# Patient Record
Sex: MALE | Race: BLACK OR AFRICAN AMERICAN | NOT HISPANIC OR LATINO | Employment: FULL TIME | URBAN - METROPOLITAN AREA
[De-identification: names, ages, dates, MRNs, and addresses within clinical notes are randomized per-mention and may not be internally consistent; named-entity substitution may affect disease eponyms.]

---

## 2021-06-19 ENCOUNTER — HOSPITAL ENCOUNTER (INPATIENT)
Facility: HOSPITAL | Age: 41
LOS: 4 days | Discharge: HOME | End: 2021-06-23
Attending: EMERGENCY MEDICINE | Admitting: INTERNAL MEDICINE

## 2021-06-19 ENCOUNTER — APPOINTMENT (OUTPATIENT)
Dept: GENERAL RADIOLOGY | Facility: HOSPITAL | Age: 41
End: 2021-06-19

## 2021-06-19 ENCOUNTER — APPOINTMENT (OUTPATIENT)
Dept: CARDIOLOGY | Facility: HOSPITAL | Age: 41
End: 2021-06-19

## 2021-06-19 DIAGNOSIS — I49.01 CARDIAC ARREST WITH VENTRICULAR FIBRILLATION (HCC): Primary | ICD-10-CM

## 2021-06-19 DIAGNOSIS — I46.9 CARDIAC ARREST WITH VENTRICULAR FIBRILLATION (HCC): Primary | ICD-10-CM

## 2021-06-19 LAB
ALBUMIN SERPL-MCNC: 4.5 G/DL (ref 3.5–5.2)
ALBUMIN/GLOB SERPL: 1.7 G/DL
ALP SERPL-CCNC: 83 U/L (ref 39–117)
ALT SERPL W P-5'-P-CCNC: 82 U/L (ref 1–41)
ANION GAP SERPL CALCULATED.3IONS-SCNC: 15.7 MMOL/L (ref 5–15)
APTT PPP: 23.3 SECONDS (ref 22.7–35.4)
ASCENDING AORTA: 2.9 CM
AST SERPL-CCNC: 66 U/L (ref 1–40)
BASOPHILS # BLD MANUAL: 0.12 10*3/MM3 (ref 0–0.2)
BASOPHILS NFR BLD AUTO: 1 % (ref 0–1.5)
BH CV ECHO MEAS - ACS: 2.2 CM
BH CV ECHO MEAS - AO MAX PG (FULL): 4.1 MMHG
BH CV ECHO MEAS - AO MAX PG: 11.6 MMHG
BH CV ECHO MEAS - AO MEAN PG (FULL): 2 MMHG
BH CV ECHO MEAS - AO MEAN PG: 6 MMHG
BH CV ECHO MEAS - AO ROOT AREA (BSA CORRECTED): 1.3
BH CV ECHO MEAS - AO ROOT AREA: 7.5 CM^2
BH CV ECHO MEAS - AO ROOT DIAM: 3.1 CM
BH CV ECHO MEAS - AO V2 MAX: 170 CM/SEC
BH CV ECHO MEAS - AO V2 MEAN: 115 CM/SEC
BH CV ECHO MEAS - AO V2 VTI: 34.6 CM
BH CV ECHO MEAS - ASC AORTA: 2.9 CM
BH CV ECHO MEAS - AVA(I,A): 2.7 CM^2
BH CV ECHO MEAS - AVA(I,D): 2.7 CM^2
BH CV ECHO MEAS - AVA(V,A): 2.5 CM^2
BH CV ECHO MEAS - AVA(V,D): 2.5 CM^2
BH CV ECHO MEAS - BSA(HAYCOCK): 2.5 M^2
BH CV ECHO MEAS - BSA: 2.4 M^2
BH CV ECHO MEAS - BZI_BMI: 38.4 KILOGRAMS/M^2
BH CV ECHO MEAS - BZI_METRIC_HEIGHT: 180.3 CM
BH CV ECHO MEAS - BZI_METRIC_WEIGHT: 124.7 KG
BH CV ECHO MEAS - CONTRAST EF (2CH): 75 CM2
BH CV ECHO MEAS - CONTRAST EF 4CH: 68 CM2
BH CV ECHO MEAS - EDV(MOD-SP2): 96 ML
BH CV ECHO MEAS - EDV(MOD-SP4): 117 ML
BH CV ECHO MEAS - EDV(TEICH): 92.4 ML
BH CV ECHO MEAS - EF(CUBED): 67.3 %
BH CV ECHO MEAS - EF(MOD-BP): 71 %
BH CV ECHO MEAS - EF(MOD-SP2): 75 %
BH CV ECHO MEAS - EF(MOD-SP4): 68.4 %
BH CV ECHO MEAS - EF(TEICH): 59 %
BH CV ECHO MEAS - ESV(MOD-SP2): 24 ML
BH CV ECHO MEAS - ESV(MOD-SP4): 37 ML
BH CV ECHO MEAS - ESV(TEICH): 37.9 ML
BH CV ECHO MEAS - FS: 31.1 %
BH CV ECHO MEAS - IVS/LVPW: 1
BH CV ECHO MEAS - IVSD: 0.8 CM
BH CV ECHO MEAS - LAT PEAK E' VEL: 12.3 CM/SEC
BH CV ECHO MEAS - LV DIASTOLIC VOL/BSA (35-75): 48.5 ML/M^2
BH CV ECHO MEAS - LV MASS(C)D: 113.6 GRAMS
BH CV ECHO MEAS - LV MASS(C)DI: 47.1 GRAMS/M^2
BH CV ECHO MEAS - LV MAX PG: 7.5 MMHG
BH CV ECHO MEAS - LV MEAN PG: 4 MMHG
BH CV ECHO MEAS - LV SYSTOLIC VOL/BSA (12-30): 15.3 ML/M^2
BH CV ECHO MEAS - LV V1 MAX: 137 CM/SEC
BH CV ECHO MEAS - LV V1 MEAN: 89.4 CM/SEC
BH CV ECHO MEAS - LV V1 VTI: 29.2 CM
BH CV ECHO MEAS - LVIDD: 4.5 CM
BH CV ECHO MEAS - LVIDS: 3.1 CM
BH CV ECHO MEAS - LVLD AP2: 8 CM
BH CV ECHO MEAS - LVLD AP4: 8.3 CM
BH CV ECHO MEAS - LVLS AP2: 6.2 CM
BH CV ECHO MEAS - LVLS AP4: 6.9 CM
BH CV ECHO MEAS - LVOT AREA (M): 3.1 CM^2
BH CV ECHO MEAS - LVOT AREA: 3.1 CM^2
BH CV ECHO MEAS - LVOT DIAM: 2 CM
BH CV ECHO MEAS - LVPWD: 0.8 CM
BH CV ECHO MEAS - MED PEAK E' VEL: 10.1 CM/SEC
BH CV ECHO MEAS - MR MAX PG: 13.2 MMHG
BH CV ECHO MEAS - MR MAX VEL: 182 CM/SEC
BH CV ECHO MEAS - MV A DUR: 0.16 SEC
BH CV ECHO MEAS - MV A MAX VEL: 47.7 CM/SEC
BH CV ECHO MEAS - MV DEC SLOPE: 625 CM/SEC^2
BH CV ECHO MEAS - MV DEC TIME: 0.18 SEC
BH CV ECHO MEAS - MV E MAX VEL: 75.3 CM/SEC
BH CV ECHO MEAS - MV E/A: 1.6
BH CV ECHO MEAS - MV MAX PG: 2.5 MMHG
BH CV ECHO MEAS - MV MEAN PG: 1 MMHG
BH CV ECHO MEAS - MV P1/2T MAX VEL: 84.9 CM/SEC
BH CV ECHO MEAS - MV P1/2T: 39.8 MSEC
BH CV ECHO MEAS - MV V2 MAX: 78.6 CM/SEC
BH CV ECHO MEAS - MV V2 MEAN: 46 CM/SEC
BH CV ECHO MEAS - MV V2 VTI: 19.7 CM
BH CV ECHO MEAS - MVA P1/2T LCG: 2.6 CM^2
BH CV ECHO MEAS - MVA(P1/2T): 5.5 CM^2
BH CV ECHO MEAS - MVA(VTI): 4.7 CM^2
BH CV ECHO MEAS - PA ACC TIME: 0.13 SEC
BH CV ECHO MEAS - PA MAX PG (FULL): 2.8 MMHG
BH CV ECHO MEAS - PA MAX PG: 5 MMHG
BH CV ECHO MEAS - PA PR(ACCEL): 20.5 MMHG
BH CV ECHO MEAS - PA V2 MAX: 112 CM/SEC
BH CV ECHO MEAS - PULM A REVS DUR: 0.13 SEC
BH CV ECHO MEAS - PULM A REVS VEL: 28 CM/SEC
BH CV ECHO MEAS - PULM DIAS VEL: 49.1 CM/SEC
BH CV ECHO MEAS - PULM S/D: 0.73
BH CV ECHO MEAS - PULM SYS VEL: 36 CM/SEC
BH CV ECHO MEAS - PVA(V,A): 2.1 CM^2
BH CV ECHO MEAS - PVA(V,D): 2.1 CM^2
BH CV ECHO MEAS - QP/QS: 0.64
BH CV ECHO MEAS - RAP SYSTOLE: 3 MMHG
BH CV ECHO MEAS - RV MAX PG: 2.2 MMHG
BH CV ECHO MEAS - RV MEAN PG: 1 MMHG
BH CV ECHO MEAS - RV V1 MAX: 74.4 CM/SEC
BH CV ECHO MEAS - RV V1 MEAN: 57.2 CM/SEC
BH CV ECHO MEAS - RV V1 VTI: 18.7 CM
BH CV ECHO MEAS - RVOT AREA: 3.1 CM^2
BH CV ECHO MEAS - RVOT DIAM: 2 CM
BH CV ECHO MEAS - RVSP: 18.7 MMHG
BH CV ECHO MEAS - SI(AO): 108.2 ML/M^2
BH CV ECHO MEAS - SI(CUBED): 25.4 ML/M^2
BH CV ECHO MEAS - SI(LVOT): 38 ML/M^2
BH CV ECHO MEAS - SI(MOD-SP2): 29.8 ML/M^2
BH CV ECHO MEAS - SI(MOD-SP4): 33.1 ML/M^2
BH CV ECHO MEAS - SI(TEICH): 22.6 ML/M^2
BH CV ECHO MEAS - SV(AO): 261.2 ML
BH CV ECHO MEAS - SV(CUBED): 61.3 ML
BH CV ECHO MEAS - SV(LVOT): 91.7 ML
BH CV ECHO MEAS - SV(MOD-SP2): 72 ML
BH CV ECHO MEAS - SV(MOD-SP4): 80 ML
BH CV ECHO MEAS - SV(RVOT): 58.7 ML
BH CV ECHO MEAS - SV(TEICH): 54.5 ML
BH CV ECHO MEAS - TAPSE (>1.6): 2.8 CM
BH CV ECHO MEAS - TR MAX VEL: 198 CM/SEC
BH CV ECHO MEASUREMENTS AVERAGE E/E' RATIO: 6.72
BH CV XLRA - RV BASE: 3.4 CM
BH CV XLRA - RV LENGTH: 5.9 CM
BH CV XLRA - RV MID: 2.8 CM
BH CV XLRA - TDI S': 15.9 CM/SEC
BILIRUB SERPL-MCNC: 0.6 MG/DL (ref 0–1.2)
BUN SERPL-MCNC: 21 MG/DL (ref 6–20)
BUN/CREAT SERPL: 13.3 (ref 7–25)
CALCIUM SPEC-SCNC: 8.8 MG/DL (ref 8.6–10.5)
CHLORIDE SERPL-SCNC: 104 MMOL/L (ref 98–107)
CO2 SERPL-SCNC: 22.3 MMOL/L (ref 22–29)
CREAT SERPL-MCNC: 1.58 MG/DL (ref 0.76–1.27)
DEPRECATED RDW RBC AUTO: 43.8 FL (ref 37–54)
ERYTHROCYTE [DISTWIDTH] IN BLOOD BY AUTOMATED COUNT: 14.5 % (ref 12.3–15.4)
GFR SERPL CREATININE-BSD FRML MDRD: 59 ML/MIN/1.73
GLOBULIN UR ELPH-MCNC: 2.6 GM/DL
GLUCOSE SERPL-MCNC: 178 MG/DL (ref 65–99)
HCT VFR BLD AUTO: 50.2 % (ref 37.5–51)
HGB BLD-MCNC: 16.4 G/DL (ref 13–17.7)
INR PPP: 1.11 (ref 0.9–1.1)
LEFT ATRIUM VOLUME INDEX: 20 ML/M2
LV EF 2D ECHO EST: 71 %
LYMPHOCYTES # BLD MANUAL: 2.11 10*3/MM3 (ref 0.7–3.1)
LYMPHOCYTES NFR BLD MANUAL: 17.3 % (ref 19.6–45.3)
LYMPHOCYTES NFR BLD MANUAL: 8.2 % (ref 5–12)
MAGNESIUM SERPL-MCNC: 1.8 MG/DL (ref 1.6–2.6)
MCH RBC QN AUTO: 27.2 PG (ref 26.6–33)
MCHC RBC AUTO-ENTMCNC: 32.7 G/DL (ref 31.5–35.7)
MCV RBC AUTO: 83.4 FL (ref 79–97)
MONOCYTES # BLD AUTO: 1 10*3/MM3 (ref 0.1–0.9)
NEUTROPHILS # BLD AUTO: 8.94 10*3/MM3 (ref 1.7–7)
NEUTROPHILS NFR BLD MANUAL: 73.5 % (ref 42.7–76)
PLAT MORPH BLD: NORMAL
PLATELET # BLD AUTO: 225 10*3/MM3 (ref 140–450)
PMV BLD AUTO: 10.5 FL (ref 6–12)
POTASSIUM SERPL-SCNC: 3.6 MMOL/L (ref 3.5–5.2)
PROT SERPL-MCNC: 7.1 G/DL (ref 6–8.5)
PROTHROMBIN TIME: 14.1 SECONDS (ref 11.7–14.2)
RBC # BLD AUTO: 6.02 10*6/MM3 (ref 4.14–5.8)
RBC MORPH BLD: NORMAL
SARS-COV-2 RNA RESP QL NAA+PROBE: NOT DETECTED
SINUS: 3 CM
SMUDGE CELLS BLD QL SMEAR: ABNORMAL
SODIUM SERPL-SCNC: 142 MMOL/L (ref 136–145)
STJ: 2.6 CM
TROPONIN T SERPL-MCNC: <0.01 NG/ML (ref 0–0.03)
WBC # BLD AUTO: 12.17 10*3/MM3 (ref 3.4–10.8)

## 2021-06-19 PROCEDURE — 96365 THER/PROPH/DIAG IV INF INIT: CPT

## 2021-06-19 PROCEDURE — C1769 GUIDE WIRE: HCPCS | Performed by: INTERNAL MEDICINE

## 2021-06-19 PROCEDURE — 99152 MOD SED SAME PHYS/QHP 5/>YRS: CPT | Performed by: INTERNAL MEDICINE

## 2021-06-19 PROCEDURE — 93005 ELECTROCARDIOGRAM TRACING: CPT | Performed by: PHYSICIAN ASSISTANT

## 2021-06-19 PROCEDURE — 83735 ASSAY OF MAGNESIUM: CPT | Performed by: PHYSICIAN ASSISTANT

## 2021-06-19 PROCEDURE — 93306 TTE W/DOPPLER COMPLETE: CPT | Performed by: INTERNAL MEDICINE

## 2021-06-19 PROCEDURE — 99291 CRITICAL CARE FIRST HOUR: CPT | Performed by: INTERNAL MEDICINE

## 2021-06-19 PROCEDURE — 85025 COMPLETE CBC W/AUTO DIFF WBC: CPT | Performed by: PHYSICIAN ASSISTANT

## 2021-06-19 PROCEDURE — 99285 EMERGENCY DEPT VISIT HI MDM: CPT

## 2021-06-19 PROCEDURE — 25010000002 AMIODARONE IN DEXTROSE 5% 360-4.14 MG/200ML-% SOLUTION: Performed by: PHYSICIAN ASSISTANT

## 2021-06-19 PROCEDURE — 93010 ELECTROCARDIOGRAM REPORT: CPT | Performed by: INTERNAL MEDICINE

## 2021-06-19 PROCEDURE — 25010000002 FENTANYL CITRATE (PF) 50 MCG/ML SOLUTION: Performed by: INTERNAL MEDICINE

## 2021-06-19 PROCEDURE — C1894 INTRO/SHEATH, NON-LASER: HCPCS | Performed by: INTERNAL MEDICINE

## 2021-06-19 PROCEDURE — 25010000002 HEPARIN (PORCINE) PER 1000 UNITS: Performed by: INTERNAL MEDICINE

## 2021-06-19 PROCEDURE — 96366 THER/PROPH/DIAG IV INF ADDON: CPT

## 2021-06-19 PROCEDURE — 85007 BL SMEAR W/DIFF WBC COUNT: CPT | Performed by: PHYSICIAN ASSISTANT

## 2021-06-19 PROCEDURE — 85610 PROTHROMBIN TIME: CPT | Performed by: PHYSICIAN ASSISTANT

## 2021-06-19 PROCEDURE — 84484 ASSAY OF TROPONIN QUANT: CPT | Performed by: PHYSICIAN ASSISTANT

## 2021-06-19 PROCEDURE — 4A023N7 MEASUREMENT OF CARDIAC SAMPLING AND PRESSURE, LEFT HEART, PERCUTANEOUS APPROACH: ICD-10-PCS | Performed by: INTERNAL MEDICINE

## 2021-06-19 PROCEDURE — 25010000002 MIDAZOLAM PER 1 MG: Performed by: INTERNAL MEDICINE

## 2021-06-19 PROCEDURE — 85730 THROMBOPLASTIN TIME PARTIAL: CPT | Performed by: PHYSICIAN ASSISTANT

## 2021-06-19 PROCEDURE — 0 IOPAMIDOL PER 1 ML: Performed by: INTERNAL MEDICINE

## 2021-06-19 PROCEDURE — B2111ZZ FLUOROSCOPY OF MULTIPLE CORONARY ARTERIES USING LOW OSMOLAR CONTRAST: ICD-10-PCS | Performed by: INTERNAL MEDICINE

## 2021-06-19 PROCEDURE — 25010000002 AMIODARONE IN DEXTROSE 5% 360-4.14 MG/200ML-% SOLUTION: Performed by: INTERNAL MEDICINE

## 2021-06-19 PROCEDURE — U0003 INFECTIOUS AGENT DETECTION BY NUCLEIC ACID (DNA OR RNA); SEVERE ACUTE RESPIRATORY SYNDROME CORONAVIRUS 2 (SARS-COV-2) (CORONAVIRUS DISEASE [COVID-19]), AMPLIFIED PROBE TECHNIQUE, MAKING USE OF HIGH THROUGHPUT TECHNOLOGIES AS DESCRIBED BY CMS-2020-01-R: HCPCS | Performed by: PHYSICIAN ASSISTANT

## 2021-06-19 PROCEDURE — B2151ZZ FLUOROSCOPY OF LEFT HEART USING LOW OSMOLAR CONTRAST: ICD-10-PCS | Performed by: INTERNAL MEDICINE

## 2021-06-19 PROCEDURE — 80053 COMPREHEN METABOLIC PANEL: CPT | Performed by: PHYSICIAN ASSISTANT

## 2021-06-19 PROCEDURE — 25010000002 PERFLUTREN (DEFINITY) 8.476 MG IN SODIUM CHLORIDE (PF) 0.9 % 10 ML INJECTION: Performed by: PHYSICIAN ASSISTANT

## 2021-06-19 PROCEDURE — 93458 L HRT ARTERY/VENTRICLE ANGIO: CPT | Performed by: INTERNAL MEDICINE

## 2021-06-19 PROCEDURE — 93306 TTE W/DOPPLER COMPLETE: CPT

## 2021-06-19 PROCEDURE — 71045 X-RAY EXAM CHEST 1 VIEW: CPT

## 2021-06-19 RX ORDER — ACETAMINOPHEN 325 MG/1
650 TABLET ORAL EVERY 4 HOURS PRN
Status: DISCONTINUED | OUTPATIENT
Start: 2021-06-19 | End: 2021-06-23 | Stop reason: HOSPADM

## 2021-06-19 RX ORDER — SODIUM CHLORIDE 9 MG/ML
INJECTION, SOLUTION INTRAVENOUS CONTINUOUS PRN
Status: COMPLETED | OUTPATIENT
Start: 2021-06-19 | End: 2021-06-19

## 2021-06-19 RX ORDER — LIDOCAINE HYDROCHLORIDE 20 MG/ML
INJECTION, SOLUTION INFILTRATION; PERINEURAL AS NEEDED
Status: DISCONTINUED | OUTPATIENT
Start: 2021-06-19 | End: 2021-06-19 | Stop reason: HOSPADM

## 2021-06-19 RX ORDER — FENTANYL CITRATE 50 UG/ML
INJECTION, SOLUTION INTRAMUSCULAR; INTRAVENOUS AS NEEDED
Status: DISCONTINUED | OUTPATIENT
Start: 2021-06-19 | End: 2021-06-19 | Stop reason: HOSPADM

## 2021-06-19 RX ORDER — MIDAZOLAM HYDROCHLORIDE 1 MG/ML
INJECTION INTRAMUSCULAR; INTRAVENOUS AS NEEDED
Status: DISCONTINUED | OUTPATIENT
Start: 2021-06-19 | End: 2021-06-19 | Stop reason: HOSPADM

## 2021-06-19 RX ADMIN — PERFLUTREN 1 ML: 6.52 INJECTION, SUSPENSION INTRAVENOUS at 14:43

## 2021-06-19 RX ADMIN — AMIODARONE HYDROCHLORIDE 0.5 MG/MIN: 1.8 INJECTION, SOLUTION INTRAVENOUS at 20:01

## 2021-06-19 RX ADMIN — AMIODARONE HYDROCHLORIDE 1 MG/MIN: 1.8 INJECTION, SOLUTION INTRAVENOUS at 13:48

## 2021-06-20 LAB
ANION GAP SERPL CALCULATED.3IONS-SCNC: 10.6 MMOL/L (ref 5–15)
BUN SERPL-MCNC: 19 MG/DL (ref 6–20)
BUN/CREAT SERPL: 16.1 (ref 7–25)
CALCIUM SPEC-SCNC: 8.8 MG/DL (ref 8.6–10.5)
CHLORIDE SERPL-SCNC: 105 MMOL/L (ref 98–107)
CHOLEST SERPL-MCNC: 154 MG/DL (ref 0–200)
CO2 SERPL-SCNC: 23.4 MMOL/L (ref 22–29)
CREAT SERPL-MCNC: 1.18 MG/DL (ref 0.76–1.27)
DEPRECATED RDW RBC AUTO: 42 FL (ref 37–54)
ERYTHROCYTE [DISTWIDTH] IN BLOOD BY AUTOMATED COUNT: 14.4 % (ref 12.3–15.4)
GFR SERPL CREATININE-BSD FRML MDRD: 83 ML/MIN/1.73
GLUCOSE SERPL-MCNC: 83 MG/DL (ref 65–99)
HBA1C MFR BLD: 5.4 % (ref 4.8–5.6)
HCT VFR BLD AUTO: 45 % (ref 37.5–51)
HDLC SERPL-MCNC: 43 MG/DL (ref 40–60)
HGB BLD-MCNC: 14.9 G/DL (ref 13–17.7)
LDLC SERPL CALC-MCNC: 100 MG/DL (ref 0–100)
LDLC/HDLC SERPL: 2.33 {RATIO}
MCH RBC QN AUTO: 26.7 PG (ref 26.6–33)
MCHC RBC AUTO-ENTMCNC: 33.1 G/DL (ref 31.5–35.7)
MCV RBC AUTO: 80.6 FL (ref 79–97)
PLATELET # BLD AUTO: 222 10*3/MM3 (ref 140–450)
PMV BLD AUTO: 9.9 FL (ref 6–12)
POTASSIUM SERPL-SCNC: 4 MMOL/L (ref 3.5–5.2)
QT INTERVAL: 380 MS
RBC # BLD AUTO: 5.58 10*6/MM3 (ref 4.14–5.8)
SODIUM SERPL-SCNC: 139 MMOL/L (ref 136–145)
TRIGL SERPL-MCNC: 53 MG/DL (ref 0–150)
VLDLC SERPL-MCNC: 11 MG/DL (ref 5–40)
WBC # BLD AUTO: 6.76 10*3/MM3 (ref 3.4–10.8)

## 2021-06-20 PROCEDURE — 85027 COMPLETE CBC AUTOMATED: CPT | Performed by: INTERNAL MEDICINE

## 2021-06-20 PROCEDURE — 80061 LIPID PANEL: CPT | Performed by: INTERNAL MEDICINE

## 2021-06-20 PROCEDURE — 83036 HEMOGLOBIN GLYCOSYLATED A1C: CPT | Performed by: INTERNAL MEDICINE

## 2021-06-20 PROCEDURE — 80048 BASIC METABOLIC PNL TOTAL CA: CPT | Performed by: INTERNAL MEDICINE

## 2021-06-20 PROCEDURE — 25010000002 AMIODARONE IN DEXTROSE 5% 360-4.14 MG/200ML-% SOLUTION: Performed by: INTERNAL MEDICINE

## 2021-06-20 PROCEDURE — 99231 SBSQ HOSP IP/OBS SF/LOW 25: CPT | Performed by: INTERNAL MEDICINE

## 2021-06-20 RX ORDER — METOPROLOL SUCCINATE 25 MG/1
25 TABLET, EXTENDED RELEASE ORAL
Status: DISCONTINUED | OUTPATIENT
Start: 2021-06-20 | End: 2021-06-22

## 2021-06-20 RX ADMIN — AMIODARONE HYDROCHLORIDE 0.5 MG/MIN: 1.8 INJECTION, SOLUTION INTRAVENOUS at 08:25

## 2021-06-20 RX ADMIN — ACETAMINOPHEN 650 MG: 325 TABLET, FILM COATED ORAL at 23:53

## 2021-06-20 RX ADMIN — METOPROLOL SUCCINATE 25 MG: 25 TABLET, EXTENDED RELEASE ORAL at 10:09

## 2021-06-21 PROCEDURE — 99231 SBSQ HOSP IP/OBS SF/LOW 25: CPT | Performed by: INTERNAL MEDICINE

## 2021-06-21 PROCEDURE — 99254 IP/OBS CNSLTJ NEW/EST MOD 60: CPT | Performed by: INTERNAL MEDICINE

## 2021-06-21 RX ADMIN — METOPROLOL SUCCINATE 25 MG: 25 TABLET, EXTENDED RELEASE ORAL at 09:21

## 2021-06-22 ENCOUNTER — APPOINTMENT (OUTPATIENT)
Dept: MRI IMAGING | Facility: HOSPITAL | Age: 41
End: 2021-06-22

## 2021-06-22 ENCOUNTER — APPOINTMENT (OUTPATIENT)
Dept: GENERAL RADIOLOGY | Facility: HOSPITAL | Age: 41
End: 2021-06-22

## 2021-06-22 PROCEDURE — 33249 INSJ/RPLCMT DEFIB W/LEAD(S): CPT | Performed by: INTERNAL MEDICINE

## 2021-06-22 PROCEDURE — C1730 CATH, EP, 19 OR FEW ELECT: HCPCS | Performed by: INTERNAL MEDICINE

## 2021-06-22 PROCEDURE — 93620 COMP EP EVL R AT VEN PAC&REC: CPT | Performed by: INTERNAL MEDICINE

## 2021-06-22 PROCEDURE — 25010000002 MIDAZOLAM PER 1 MG: Performed by: INTERNAL MEDICINE

## 2021-06-22 PROCEDURE — A9577 INJ MULTIHANCE: HCPCS | Performed by: INTERNAL MEDICINE

## 2021-06-22 PROCEDURE — 4A0234Z MEASUREMENT OF CARDIAC ELECTRICAL ACTIVITY, PERCUTANEOUS APPROACH: ICD-10-PCS | Performed by: INTERNAL MEDICINE

## 2021-06-22 PROCEDURE — 0 GADOBENATE DIMEGLUMINE 529 MG/ML SOLUTION: Performed by: INTERNAL MEDICINE

## 2021-06-22 PROCEDURE — 71045 X-RAY EXAM CHEST 1 VIEW: CPT

## 2021-06-22 PROCEDURE — C1777 LEAD, AICD, ENDO SINGLE COIL: HCPCS | Performed by: INTERNAL MEDICINE

## 2021-06-22 PROCEDURE — 93005 ELECTROCARDIOGRAM TRACING: CPT | Performed by: INTERNAL MEDICINE

## 2021-06-22 PROCEDURE — 02HK3KZ INSERTION OF DEFIBRILLATOR LEAD INTO RIGHT VENTRICLE, PERCUTANEOUS APPROACH: ICD-10-PCS | Performed by: INTERNAL MEDICINE

## 2021-06-22 PROCEDURE — 0JH608Z INSERTION OF DEFIBRILLATOR GENERATOR INTO CHEST SUBCUTANEOUS TISSUE AND FASCIA, OPEN APPROACH: ICD-10-PCS | Performed by: INTERNAL MEDICINE

## 2021-06-22 PROCEDURE — 25010000002 VANCOMYCIN PER 500 MG: Performed by: INTERNAL MEDICINE

## 2021-06-22 PROCEDURE — 25010000002 FENTANYL CITRATE (PF) 50 MCG/ML SOLUTION: Performed by: INTERNAL MEDICINE

## 2021-06-22 PROCEDURE — 99152 MOD SED SAME PHYS/QHP 5/>YRS: CPT | Performed by: INTERNAL MEDICINE

## 2021-06-22 PROCEDURE — 99153 MOD SED SAME PHYS/QHP EA: CPT | Performed by: INTERNAL MEDICINE

## 2021-06-22 PROCEDURE — 99231 SBSQ HOSP IP/OBS SF/LOW 25: CPT | Performed by: INTERNAL MEDICINE

## 2021-06-22 PROCEDURE — C1894 INTRO/SHEATH, NON-LASER: HCPCS | Performed by: INTERNAL MEDICINE

## 2021-06-22 PROCEDURE — 75561 CARDIAC MRI FOR MORPH W/DYE: CPT

## 2021-06-22 PROCEDURE — C1722 AICD, SINGLE CHAMBER: HCPCS | Performed by: INTERNAL MEDICINE

## 2021-06-22 PROCEDURE — 75561 CARDIAC MRI FOR MORPH W/DYE: CPT | Performed by: INTERNAL MEDICINE

## 2021-06-22 DEVICE — IMPLANTABLE CARDIOVERTER DEFIBRILLATOR VR
Type: IMPLANTABLE DEVICE | Status: FUNCTIONAL
Brand: VIGILANT™ EL ICD VR

## 2021-06-22 DEVICE — INTEGRATED BIPOLAR PACE/SENSE AND DEFIBRILLATION LEAD
Type: IMPLANTABLE DEVICE | Status: FUNCTIONAL
Brand: RELIANCE 4-FRONT™

## 2021-06-22 RX ORDER — SODIUM CHLORIDE 9 MG/ML
INJECTION, SOLUTION INTRAVENOUS CONTINUOUS PRN
Status: COMPLETED | OUTPATIENT
Start: 2021-06-22 | End: 2021-06-22

## 2021-06-22 RX ORDER — ACETAMINOPHEN 650 MG/1
650 SUPPOSITORY RECTAL EVERY 4 HOURS PRN
Status: DISCONTINUED | OUTPATIENT
Start: 2021-06-22 | End: 2021-06-23 | Stop reason: HOSPADM

## 2021-06-22 RX ORDER — LIDOCAINE HYDROCHLORIDE AND EPINEPHRINE 10; 10 MG/ML; UG/ML
INJECTION, SOLUTION INFILTRATION; PERINEURAL AS NEEDED
Status: DISCONTINUED | OUTPATIENT
Start: 2021-06-22 | End: 2021-06-22 | Stop reason: HOSPADM

## 2021-06-22 RX ORDER — VANCOMYCIN HYDROCHLORIDE 1 G/200ML
INJECTION, SOLUTION INTRAVENOUS CONTINUOUS PRN
Status: COMPLETED | OUTPATIENT
Start: 2021-06-22 | End: 2021-06-22

## 2021-06-22 RX ORDER — MIDAZOLAM HYDROCHLORIDE 1 MG/ML
INJECTION INTRAMUSCULAR; INTRAVENOUS AS NEEDED
Status: DISCONTINUED | OUTPATIENT
Start: 2021-06-22 | End: 2021-06-22 | Stop reason: HOSPADM

## 2021-06-22 RX ORDER — SODIUM CHLORIDE 0.9 % (FLUSH) 0.9 %
3 SYRINGE (ML) INJECTION EVERY 12 HOURS SCHEDULED
Status: DISCONTINUED | OUTPATIENT
Start: 2021-06-22 | End: 2021-06-23 | Stop reason: HOSPADM

## 2021-06-22 RX ORDER — METOPROLOL SUCCINATE 50 MG/1
50 TABLET, EXTENDED RELEASE ORAL
Status: DISCONTINUED | OUTPATIENT
Start: 2021-06-22 | End: 2021-06-23 | Stop reason: HOSPADM

## 2021-06-22 RX ORDER — ACETAMINOPHEN 325 MG/1
650 TABLET ORAL EVERY 4 HOURS PRN
Status: DISCONTINUED | OUTPATIENT
Start: 2021-06-22 | End: 2021-06-23 | Stop reason: HOSPADM

## 2021-06-22 RX ORDER — SODIUM CHLORIDE 0.9 % (FLUSH) 0.9 %
10 SYRINGE (ML) INJECTION AS NEEDED
Status: DISCONTINUED | OUTPATIENT
Start: 2021-06-22 | End: 2021-06-23 | Stop reason: HOSPADM

## 2021-06-22 RX ORDER — HYDROCODONE BITARTRATE AND ACETAMINOPHEN 5; 325 MG/1; MG/1
1 TABLET ORAL EVERY 4 HOURS PRN
Status: DISCONTINUED | OUTPATIENT
Start: 2021-06-22 | End: 2021-06-23 | Stop reason: HOSPADM

## 2021-06-22 RX ORDER — NALOXONE HCL 0.4 MG/ML
0.4 VIAL (ML) INJECTION
Status: DISCONTINUED | OUTPATIENT
Start: 2021-06-22 | End: 2021-06-23 | Stop reason: HOSPADM

## 2021-06-22 RX ORDER — FENTANYL CITRATE 50 UG/ML
INJECTION, SOLUTION INTRAMUSCULAR; INTRAVENOUS AS NEEDED
Status: DISCONTINUED | OUTPATIENT
Start: 2021-06-22 | End: 2021-06-22 | Stop reason: HOSPADM

## 2021-06-22 RX ORDER — HYDROMORPHONE HYDROCHLORIDE 1 MG/ML
0.5 INJECTION, SOLUTION INTRAMUSCULAR; INTRAVENOUS; SUBCUTANEOUS
Status: DISCONTINUED | OUTPATIENT
Start: 2021-06-22 | End: 2021-06-23 | Stop reason: HOSPADM

## 2021-06-22 RX ADMIN — METOPROLOL SUCCINATE 25 MG: 25 TABLET, EXTENDED RELEASE ORAL at 11:22

## 2021-06-22 RX ADMIN — GADOBENATE DIMEGLUMINE 20 ML: 529 INJECTION, SOLUTION INTRAVENOUS at 10:24

## 2021-06-22 RX ADMIN — SODIUM CHLORIDE, PRESERVATIVE FREE 3 ML: 5 INJECTION INTRAVENOUS at 21:26

## 2021-06-22 RX ADMIN — METOPROLOL SUCCINATE 50 MG: 50 TABLET, EXTENDED RELEASE ORAL at 21:26

## 2021-06-23 VITALS
TEMPERATURE: 97.8 F | WEIGHT: 275 LBS | SYSTOLIC BLOOD PRESSURE: 122 MMHG | DIASTOLIC BLOOD PRESSURE: 88 MMHG | HEART RATE: 55 BPM | OXYGEN SATURATION: 98 % | BODY MASS INDEX: 38.5 KG/M2 | HEIGHT: 71 IN | RESPIRATION RATE: 16 BRPM

## 2021-06-23 LAB
QT INTERVAL: 374 MS
QT INTERVAL: 388 MS

## 2021-06-23 PROCEDURE — 99024 POSTOP FOLLOW-UP VISIT: CPT | Performed by: INTERNAL MEDICINE

## 2021-06-23 PROCEDURE — 99238 HOSP IP/OBS DSCHRG MGMT 30/<: CPT | Performed by: INTERNAL MEDICINE

## 2021-06-23 PROCEDURE — 93005 ELECTROCARDIOGRAM TRACING: CPT | Performed by: INTERNAL MEDICINE

## 2021-06-23 RX ORDER — METOPROLOL SUCCINATE 50 MG/1
50 TABLET, EXTENDED RELEASE ORAL
Qty: 30 TABLET | Refills: 11 | Status: SHIPPED | OUTPATIENT
Start: 2021-06-24 | End: 2022-11-02

## 2021-06-23 RX ADMIN — ACETAMINOPHEN 650 MG: 325 TABLET, FILM COATED ORAL at 04:41

## 2021-06-23 RX ADMIN — SODIUM CHLORIDE, PRESERVATIVE FREE 3 ML: 5 INJECTION INTRAVENOUS at 08:15

## 2021-06-23 RX ADMIN — ACETAMINOPHEN 650 MG: 325 TABLET, FILM COATED ORAL at 09:04

## 2021-06-30 ENCOUNTER — CLINICAL SUPPORT NO REQUIREMENTS (OUTPATIENT)
Dept: CARDIOLOGY | Facility: CLINIC | Age: 41
End: 2021-06-30

## 2021-06-30 DIAGNOSIS — I49.01 CARDIAC ARREST WITH VENTRICULAR FIBRILLATION (HCC): Primary | ICD-10-CM

## 2021-06-30 DIAGNOSIS — I46.9 CARDIAC ARREST WITH VENTRICULAR FIBRILLATION (HCC): Primary | ICD-10-CM

## 2021-06-30 PROCEDURE — 93282 PRGRMG EVAL IMPLANTABLE DFB: CPT | Performed by: INTERNAL MEDICINE

## 2021-07-01 ENCOUNTER — TELEPHONE (OUTPATIENT)
Dept: CARDIOLOGY | Facility: CLINIC | Age: 41
End: 2021-07-01

## 2021-07-01 NOTE — TELEPHONE ENCOUNTER
I think it would be okay for him to drive.  That is if he has had no further episodes on his interrogation of his device

## 2021-07-01 NOTE — TELEPHONE ENCOUNTER
Patient would like to know when he can start driving again and return to work.    He is s/p ICD placement on 6/22/21 for SVT.  He is scheduled for f/u with Sonia on 7/26/21.    Does he need to wait until f/u appt to be evaluated?

## 2021-07-01 NOTE — TELEPHONE ENCOUNTER
We should be seeing him for an incision check.    I think he should hold off until driving until we do his incision check and then we can decide.  They can come and talk to me at that time.

## 2021-07-12 ENCOUNTER — OFFICE VISIT (OUTPATIENT)
Dept: CARDIOLOGY | Facility: CLINIC | Age: 41
End: 2021-07-12

## 2021-07-12 VITALS
BODY MASS INDEX: 39.2 KG/M2 | HEART RATE: 57 BPM | DIASTOLIC BLOOD PRESSURE: 92 MMHG | HEIGHT: 71 IN | SYSTOLIC BLOOD PRESSURE: 147 MMHG | WEIGHT: 280 LBS

## 2021-07-12 DIAGNOSIS — Z95.810 AICD (AUTOMATIC CARDIOVERTER/DEFIBRILLATOR) PRESENT: ICD-10-CM

## 2021-07-12 DIAGNOSIS — I46.9 CARDIAC ARREST WITH VENTRICULAR FIBRILLATION (HCC): Primary | ICD-10-CM

## 2021-07-12 DIAGNOSIS — I49.01 CARDIAC ARREST WITH VENTRICULAR FIBRILLATION (HCC): Primary | ICD-10-CM

## 2021-07-12 PROCEDURE — 99213 OFFICE O/P EST LOW 20 MIN: CPT | Performed by: INTERNAL MEDICINE

## 2021-07-12 NOTE — PROGRESS NOTES
"2 week follow up    Subjective:        Raymond Pascual is a 41 y.o. male who here for follow up    CC  SUDDEN DEATH  AICD  HPI  41 years old male with known history of sudden cardiac death aborted with a defibrillator here for the follow-up underwent AICD implantation has been doing well with no complaints of chest pains tightness heaviness or the pressure sensation     Problems Addressed this Visit        Cardiac and Vasculature    Cardiac arrest with ventricular fibrillation (CMS/HCC) - Primary    Relevant Orders    Adult Transthoracic Echo Complete W/ Cont if Necessary Per Protocol    AICD (automatic cardioverter/defibrillator) present      Diagnoses       Codes Comments    Cardiac arrest with ventricular fibrillation (CMS/HCC)    -  Primary ICD-10-CM: I46.9, I49.01  ICD-9-CM: 427.5, 427.41     AICD (automatic cardioverter/defibrillator) present     ICD-10-CM: Z95.810  ICD-9-CM: V45.02         .    The following portions of the patient's history were reviewed and updated as appropriate: allergies, current medications, past family history, past medical history, past social history, past surgical history and problem list.    History reviewed. No pertinent past medical history.  reports that he has never smoked. He has never used smokeless tobacco. He reports previous alcohol use. He reports previous drug use. Drug: Marijuana. History reviewed. No pertinent family history.    Review of Systems  Constitutional: No wt loss, fever, fatigue  Gastrointestinal: No nausea, abdominal pain  Behavioral/Psych: No insomnia or anxiety   Cardiovascular no chest pains or tightness in the chest  Objective:       Physical Exam  /92   Pulse 57   Ht 180.3 cm (70.98\")   Wt 127 kg (280 lb)   BMI 39.07 kg/m²   General appearance: No acute changes   Neck: Trachea midline; NECK, supple, no thyromegaly or lymphadenopathy   Lungs: Normal size and shape, normal breath sounds, equal distribution of air, no rales and rhonchi   CV: S1-S2 " regular, no murmurs, no rub, no gallop   Abdomen: Soft, non-tender; no masses , no abnormal abdominal sounds   Extremities: No deformity , normal color , no peripheral edema   Skin: Normal temperature, turgor and texture; no rash, ulcers          Procedures      Echocardiogram:        Current Outpatient Medications:   •  metoprolol succinate XL (TOPROL-XL) 50 MG 24 hr tablet, Take 1 tablet by mouth Daily., Disp: 30 tablet, Rfl: 11   Assessment:        Patient Active Problem List   Diagnosis   • Cardiac arrest with ventricular fibrillation (CMS/HCC)               Plan:            ICD-10-CM ICD-9-CM   1. Cardiac arrest with ventricular fibrillation (CMS/HCC)  I46.9 427.5    I49.01 427.41   2. AICD (automatic cardioverter/defibrillator) present  Z95.810 V45.02     1. Cardiac arrest with ventricular fibrillation (CMS/HCC)  Considering patient's medical condition as well as the risk factors, patient will require echocardiogram for further evaluation for the LV function, four-chamber evaluation, including the pressures, valvular function and  pericardial disease and pericardial effusion    - Adult Transthoracic Echo Complete W/ Cont if Necessary Per Protocol; Future    2. AICD (automatic cardioverter/defibrillator) present  AICD healing well     REDUCE TOPROL 25 MG due to feeling weak and tired    SEE IN 1 YR WITH ECHO  COUNSELING:    Raymond Talley was given to patient for the following topics: diagnostic results, risk factor reductions, impressions, risks and benefits of treatment options and importance of treatment compliance .       SMOKING COUNSELING:    [unfilled]    Dictated using Dragon dictation

## 2021-07-13 ENCOUNTER — TELEPHONE (OUTPATIENT)
Dept: CARDIOLOGY | Facility: CLINIC | Age: 41
End: 2021-07-13

## 2021-07-13 NOTE — TELEPHONE ENCOUNTER
----- Message from Adele Ayala MA sent at 7/12/2021  2:37 PM EDT -----  PT WOULD LIKE TO SWITCH TO OUR OFFICE FOR PM CHECKS.   THANKS   ADELE

## 2021-07-26 ENCOUNTER — OFFICE VISIT (OUTPATIENT)
Dept: CARDIOLOGY | Facility: CLINIC | Age: 41
End: 2021-07-26

## 2021-07-26 ENCOUNTER — CLINICAL SUPPORT NO REQUIREMENTS (OUTPATIENT)
Dept: CARDIOLOGY | Facility: CLINIC | Age: 41
End: 2021-07-26

## 2021-07-26 VITALS
WEIGHT: 281 LBS | SYSTOLIC BLOOD PRESSURE: 138 MMHG | BODY MASS INDEX: 39.34 KG/M2 | HEIGHT: 71 IN | HEART RATE: 59 BPM | DIASTOLIC BLOOD PRESSURE: 98 MMHG

## 2021-07-26 DIAGNOSIS — Z95.810 AICD (AUTOMATIC CARDIOVERTER/DEFIBRILLATOR) PRESENT: Primary | ICD-10-CM

## 2021-07-26 DIAGNOSIS — E66.09 CLASS 2 OBESITY DUE TO EXCESS CALORIES WITH BODY MASS INDEX (BMI) OF 39.0 TO 39.9 IN ADULT, UNSPECIFIED WHETHER SERIOUS COMORBIDITY PRESENT: ICD-10-CM

## 2021-07-26 DIAGNOSIS — I47.29 IDIOPATHIC VENTRICULAR TACHYCARDIA (HCC): Primary | ICD-10-CM

## 2021-07-26 DIAGNOSIS — Z95.810 ICD (IMPLANTABLE CARDIOVERTER-DEFIBRILLATOR) IN PLACE: ICD-10-CM

## 2021-07-26 DIAGNOSIS — R03.0 ELEVATED BLOOD PRESSURE READING: ICD-10-CM

## 2021-07-26 PROBLEM — E66.812 CLASS 2 OBESITY DUE TO EXCESS CALORIES WITH BODY MASS INDEX (BMI) OF 39.0 TO 39.9 IN ADULT: Status: ACTIVE | Noted: 2021-07-26

## 2021-07-26 PROCEDURE — 93000 ELECTROCARDIOGRAM COMPLETE: CPT | Performed by: NURSE PRACTITIONER

## 2021-07-26 PROCEDURE — 99024 POSTOP FOLLOW-UP VISIT: CPT | Performed by: NURSE PRACTITIONER

## 2021-07-26 NOTE — PROGRESS NOTES
Date of Office Visit: 2021  Encounter Provider: CRUZ Cortez  Place of Service: [facility] CARDIOLOGY  Patient Name: Raymond Pascual  :1980    Chief Complaint   Patient presents with   • Cardiac Arrest w/vfib     1 month hosp f/u   • s/p ICD placement   :     HPI: Raymond Pascual is a 41 y.o. male who presented to the hospital in  after having a near syncopal episode while playing basketball at [facility], when EMS arrived he was in wide-complex tachycardia with hypotension, he was defibrillated and treated with amiodarone---he then passed out and had brief CPR.    He had a cardiac cath which showed mild early atherosclerotic plaque but otherwise normal with normal LV systolic function and his echo was also normal.    He underwent an EP study on  and was found to have sustained monomorphic VT from the RV near the region of the his.  He underwent implantation of a single-chamber ICD.    He was started on metoprolol and presents today for routine 1 month follow-up and device check.    He is doing well, denies chest pain, dyspnea, PND, orthopnea, dizziness or edema.     He has started resuming normal activities, without difficulty.     Device interrogation shows normal testing and function with no arrhythmia episodes.  He has RV paced less than 1%.     He saw Dr. Miguel a couple weeks ago and was doing well, no changes made and plans to see him again in 1 year with a follow-up echo per the notes.         Past Medical History:   Diagnosis Date   • Cardiac arrest with ventricular fibrillation (CMS/[facility]) 2021   • S/P ICD (internal cardiac defibrillator) procedure     ICD placement       Past Surgical History:   Procedure Laterality Date   • APPENDECTOMY     • CARDIAC CATHETERIZATION N/A 2021    Procedure: Left Heart Cath;  Surgeon: Demond Miguel MD;  Location: [facility] CATH INVASIVE LOCATION;  Service: Cardiovascular;  Laterality: N/A;   • CARDIAC  CATHETERIZATION N/A 6/19/2021    Procedure: Coronary angiography;  Surgeon: Demond Miguel MD;  Location:  [facility] CATH INVASIVE LOCATION;  Service: Cardiovascular;  Laterality: N/A;   • CARDIAC CATHETERIZATION N/A 6/19/2021    Procedure: Left ventriculography;  Surgeon: Demond Miguel MD;  Location:  [facility] CATH INVASIVE LOCATION;  Service: Cardiovascular;  Laterality: N/A;   • CARDIAC ELECTROPHYSIOLOGY PROCEDURE N/A 6/22/2021    Procedure: EP Study, possible ablation of SVT  possible ICD;  Surgeon: Boy Dumont MD;  Location:  [facility] CATH INVASIVE LOCATION;  Service: Cardiovascular;  Laterality: N/A;   • CARDIAC ELECTROPHYSIOLOGY PROCEDURE N/A 6/22/2021    Procedure: Device Implant: single chamber ICD;  Surgeon: Boy Dumont MD;  [facility];  Service: Cardiovascular;  Laterality: N/A;   • TARSAL TUNNEL RELEASE         Social History     Socioeconomic History   • Marital status:      Spouse name: Not on file   • Number of children: Not on file   • Years of education: Not on file   • Highest education level: Not on file   Tobacco Use   • Smoking status: Never Smoker   • Smokeless tobacco: Never Used   Vaping Use   • Vaping Use: Never used   Substance and Sexual Activity   • Alcohol use: Not Currently   • Drug use: Not Currently     Types: Marijuana   • Sexual activity: Defer       History reviewed. No pertinent family history.    Review of Systems   Constitutional: Negative for chills, fever and malaise/fatigue.   Cardiovascular: Negative for chest pain, dyspnea on exertion, leg swelling, near-syncope, orthopnea, palpitations, paroxysmal nocturnal dyspnea and syncope.   Respiratory: Negative for cough and shortness of breath.    Musculoskeletal: Negative for joint pain, joint swelling and myalgias.   Gastrointestinal: Negative for abdominal pain, diarrhea, melena, nausea and vomiting.   Genitourinary: Negative for frequency and hematuria.   Neurological: Negative  "for light-headedness, numbness, paresthesias and seizures.   Allergic/Immunologic: Negative.    All other systems reviewed and are negative.      No Known Allergies      Current Outpatient Medications:   •  metoprolol succinate XL (TOPROL-XL) 50 MG 24 hr tablet, Take 1 tablet by mouth Daily. (Patient taking differently: Take 25 mg by mouth Daily.), Disp: 30 tablet, Rfl: 11      Objective:     Vitals:    07/26/21 0855   BP: 138/98   Pulse: 59   Weight: 127 kg (281 lb)   Height: 180.3 cm (71\")     Body mass index is 39.19 kg/m².    PHYSICAL EXAM:    Vitals Reviewed.   General Appearance: No acute distress, well developed and well nourished.   Eyes: Conjunctiva and lids: No erythema, swelling, or discharge. Sclera non-icteric.   HENT: Atraumatic, normocephalic. External eyes, ears, and nose normal.   Respiratory: No signs of respiratory distress. Respiration rhythm and depth normal.   Clear to auscultation. No rales, crackles, rhonchi, or wheezing auscultated.   Cardiovascular:  Jugular Venous Pressure: Normal  Heart Rate and Rhythm: Normal, Heart Sounds: Normal S1 and S2. No S3 or S4 noted.  Murmurs: No murmurs noted. No rubs, thrills, or gallops.   Arterial Pulses:  Posterior tibialis and dorsalis pedis pulses normal.   Lower Extremities: No edema noted.  Gastrointestinal:  Abdomen soft, non-distended, non-tender.   Musculoskeletal: Normal movement of extremities  Skin and Nails: General appearance normal. No pallor, cyanosis, diaphoresis. Skin temperature normal. No clubbing of fingernails.   Psychiatric: Patient alert and oriented to person, place, and time. Speech and behavior appropriate. Normal mood and affect.       ECG 12 Lead    Date/Time: 7/26/2021 9:00 AM  Performed by: Sonia Berry APRN  Authorized by: Sonia Berry APRN   Comparison: compared with previous ECG   Similar to previous ECG  Rhythm: sinus rhythm  BPM: 59                Assessment:       Diagnosis Plan   1. Idiopathic ventricular " tachycardia (CMS/HCC)  ECG 12 Lead   2. ICD (implantable cardioverter-defibrillator) in place  ECG 12 Lead   3. Class 2 obesity due to excess calories with body mass index (BMI) of 39.0 to 39.9 in adult, unspecified whether serious comorbidity present     4. Elevated blood pressure reading            Plan:       1.-2. Idiopathic VT, s/p secondary preventions ICD placed 6/22/2021    3. For the problem of overweight/obesity, we discussed the importance of lifestyle measures and strategies for weight loss, such as improved nutrition, regular exercise and sleep hygiene.     4.  Blood pressure mildly elevated today, he is going to monitor this at home and I have instructed him to follow-up with PCP or cardiology if it is persistently elevated.    Follow-up with Dr. Dumont in 3 months with an in clinic device check and then will likely see us yearly.         As always, it has been a pleasure to participate in your patient's care.      Sincerely,         CRUZ Crystal

## 2021-11-01 ENCOUNTER — OFFICE VISIT (OUTPATIENT)
Dept: CARDIOLOGY | Facility: CLINIC | Age: 41
End: 2021-11-01

## 2021-11-01 ENCOUNTER — CLINICAL SUPPORT NO REQUIREMENTS (OUTPATIENT)
Dept: CARDIOLOGY | Facility: CLINIC | Age: 41
End: 2021-11-01

## 2021-11-01 VITALS
DIASTOLIC BLOOD PRESSURE: 86 MMHG | BODY MASS INDEX: 40.32 KG/M2 | SYSTOLIC BLOOD PRESSURE: 132 MMHG | HEART RATE: 53 BPM | HEIGHT: 71 IN | WEIGHT: 288 LBS

## 2021-11-01 DIAGNOSIS — I46.9 CARDIAC ARREST WITH VENTRICULAR FIBRILLATION (HCC): Primary | ICD-10-CM

## 2021-11-01 DIAGNOSIS — I49.01 CARDIAC ARREST WITH VENTRICULAR FIBRILLATION (HCC): Primary | ICD-10-CM

## 2021-11-01 DIAGNOSIS — Z95.810 ICD (IMPLANTABLE CARDIOVERTER-DEFIBRILLATOR) IN PLACE: ICD-10-CM

## 2021-11-01 DIAGNOSIS — I47.29 IDIOPATHIC VENTRICULAR TACHYCARDIA (HCC): ICD-10-CM

## 2021-11-01 DIAGNOSIS — R03.0 ELEVATED BLOOD PRESSURE READING: ICD-10-CM

## 2021-11-01 PROCEDURE — 93290 INTERROG DEV EVAL ICPMS IP: CPT | Performed by: INTERNAL MEDICINE

## 2021-11-01 PROCEDURE — 99213 OFFICE O/P EST LOW 20 MIN: CPT | Performed by: INTERNAL MEDICINE

## 2021-11-01 PROCEDURE — 93000 ELECTROCARDIOGRAM COMPLETE: CPT | Performed by: INTERNAL MEDICINE

## 2021-11-01 PROCEDURE — 93282 PRGRMG EVAL IMPLANTABLE DFB: CPT | Performed by: INTERNAL MEDICINE

## 2021-11-01 NOTE — PROGRESS NOTES
Date of Office Visit: 2021  Encounter Provider: Boy Dumont MD  Place of Service: [facility] CARDIOLOGY  Patient Name: Raymond Pascual  : 1980    Subjective:     Encounter Date:2021      Patient ID: Raymond Pascual is a 41 y.o. male who has a cc of  Idiopathic VT and cardiac arrest due to rapid VT.     I put in an ICD in  here for normal.       The patient had a good year.   No anginal chest pain,   No sig torres,   No soa,   No fainting,  No orthostasis.   No edema.   Exercise tolerance: good.     There have been no hospital admission since the last visit.     There have been no bleeding events.       Past Medical History:   Diagnosis Date   • Cardiac arrest with ventricular fibrillation (HCC) 2021   • S/P ICD (internal cardiac defibrillator) procedure     ICD placement       Social History     Socioeconomic History   • Marital status:    Tobacco Use   • Smoking status: Never Smoker   • Smokeless tobacco: Never Used   Vaping Use   • Vaping Use: Never used   Substance and Sexual Activity   • Alcohol use: Not Currently   • Drug use: Not Currently     Types: Marijuana   • Sexual activity: Defer       History reviewed. No pertinent family history.    Review of Systems   Constitutional: Negative for fever and night sweats.   HENT: Negative for ear pain and stridor.    Eyes: Negative for discharge and visual halos.   Cardiovascular: Negative for cyanosis.   Respiratory: Negative for hemoptysis and sputum production.    Hematologic/Lymphatic: Negative for adenopathy.   Skin: Negative for nail changes and unusual hair distribution.   Musculoskeletal: Negative for gout and joint swelling.   Gastrointestinal: Negative for bowel incontinence and flatus.   Genitourinary: Negative for dysuria and flank pain.   Neurological: Negative for seizures and tremors.   Psychiatric/Behavioral: Negative for altered mental status. The patient is not nervous/anxious.             Objective:  "    Vitals:    11/01/21 0849   BP: 132/86   Pulse: 53   Weight: 131 kg (288 lb)   Height: 180.3 cm (71\")         Eyes:      General:         Right eye: No discharge.         Left eye: No discharge.   HENT:      Head: Normocephalic and atraumatic.   Neck:      Thyroid: No thyromegaly.      Vascular: No JVD.   Pulmonary:      Effort: Pulmonary effort is normal.      Breath sounds: Normal breath sounds. No rales.   Cardiovascular:      Normal rate. Regular rhythm.      No gallop.   Edema:     Peripheral edema absent.   Abdominal:      General: Bowel sounds are normal.      Palpations: Abdomen is soft.      Tenderness: There is no abdominal tenderness.   Musculoskeletal: Normal range of motion.         General: No deformity. Skin:     General: Skin is warm and dry.      Findings: No erythema.   Neurological:      Mental Status: Alert and oriented to person, place, and time.      Motor: Normal muscle tone.   Psychiatric:         Behavior: Behavior normal.         Thought Content: Thought content normal.           ECG 12 Lead    Date/Time: 11/1/2021 9:41 AM  Performed by: Boy Dumont MD  Authorized by: Boy Dumont MD   Comparison: compared with previous ECG   Similar to previous ECG  Rhythm: sinus rhythm  Rate: normal  Conduction: conduction normal  ST Segments: ST segments normal  T Waves: T waves normal  QRS axis: normal    Clinical impression: normal ECG            Lab Review:       Assessment:          Diagnosis Plan   1. Cardiac arrest with ventricular fibrillation (HCC)     2. ICD (implantable cardioverter-defibrillator) in place     3. Idiopathic ventricular tachycardia (HCC)     4. Elevated blood pressure reading            Plan:     VT -- controlled on metoprolol.     ICD-- I reviewed the pacemaker/ICD tracings and the pacing and sensing parameters are normal.  No episodes.     Body mass index is 40.17 kg/m². -- we disc weight loss. And regular exercise         "

## 2022-11-02 ENCOUNTER — CLINICAL SUPPORT NO REQUIREMENTS (OUTPATIENT)
Dept: CARDIOLOGY | Facility: CLINIC | Age: 42
End: 2022-11-02

## 2022-11-02 ENCOUNTER — OFFICE VISIT (OUTPATIENT)
Dept: CARDIOLOGY | Facility: CLINIC | Age: 42
End: 2022-11-02

## 2022-11-02 VITALS
HEART RATE: 66 BPM | HEIGHT: 71 IN | SYSTOLIC BLOOD PRESSURE: 136 MMHG | WEIGHT: 288 LBS | DIASTOLIC BLOOD PRESSURE: 88 MMHG | BODY MASS INDEX: 40.32 KG/M2

## 2022-11-02 DIAGNOSIS — I46.9 CARDIAC ARREST WITH VENTRICULAR FIBRILLATION: Primary | ICD-10-CM

## 2022-11-02 DIAGNOSIS — I49.01 CARDIAC ARREST WITH VENTRICULAR FIBRILLATION: Primary | ICD-10-CM

## 2022-11-02 DIAGNOSIS — Z95.810 ICD (IMPLANTABLE CARDIOVERTER-DEFIBRILLATOR) IN PLACE: ICD-10-CM

## 2022-11-02 DIAGNOSIS — I47.29 IDIOPATHIC VENTRICULAR TACHYCARDIA: Primary | ICD-10-CM

## 2022-11-02 PROCEDURE — 99213 OFFICE O/P EST LOW 20 MIN: CPT | Performed by: NURSE PRACTITIONER

## 2022-11-02 PROCEDURE — 93000 ELECTROCARDIOGRAM COMPLETE: CPT | Performed by: NURSE PRACTITIONER

## 2022-11-02 PROCEDURE — 93282 PRGRMG EVAL IMPLANTABLE DFB: CPT | Performed by: INTERNAL MEDICINE

## 2022-11-02 PROCEDURE — 93290 INTERROG DEV EVAL ICPMS IP: CPT | Performed by: INTERNAL MEDICINE

## 2022-11-02 RX ORDER — METOPROLOL SUCCINATE 50 MG/1
50 TABLET, EXTENDED RELEASE ORAL DAILY
Qty: 30 TABLET | Refills: 11 | Status: SHIPPED | OUTPATIENT
Start: 2022-11-02

## 2022-11-02 NOTE — PROGRESS NOTES
Date of Office Visit: 2022  Encounter Provider: CRUZ Cortez  Place of Service: [facility] CARDIOLOGY  Patient Name: Raymond Pascual  :1980    Chief Complaint   Patient presents with   • idiopathic ventricular tachycardia     1 yr f/u    • cardiac arrest w/V-fib   • Pacemaker Check   :     HPI: Raymond Pascual is a 42 y.o. male who follows with Dr. iMguel and Dr. Dumont for idiopathic VT.    Presented to the hospital in 2021 after having near syncopal episode while playing basketball [employer], when EMS arrived he was in a wide-complex tachycardia with hypotension, he was defibrillated and treated with amiodarone.    Cath showed mild early atherosclerotic plaque but otherwise normal and normal LV systolic function by echo.    EP study in 2021 showed sustained monomorphic VT from the RV near the region of that he has, underwent implantation of a single-chamber ICD.    He presents today for annual follow-up.    He is doing well.  He denies chest pain, dyspnea, PND, orthopnea, dizziness or edema.    He has not been taking his metoprolol because he ran out and he has not been back to see Dr. Miguel to get his refills so he has been off of it for a couple of months.    Device interrogation shows normal function, he is RV paced less than 1%.  He does have 7 days NSVT episodes and poorly VT episodes no therapies have been delivered and all of the available EGM's appear to be sinus tach.  They also correlate with him working out at the gym as he has been trying to get back into shape.  They are all from 10/24 and 10/21.       Past Medical History:   Diagnosis Date   • Cardiac arrest with ventricular fibrillation (HCC) 2021   • S/P ICD (internal cardiac defibrillator) procedure     ICD placement       Past Surgical History:   Procedure Laterality Date   • APPENDECTOMY     • CARDIAC CATHETERIZATION N/A 2021    Procedure: Left Heart Cath;  Surgeon:  Demond Miguel MD;  Location: [facility] CATH INVASIVE LOCATION;  Service: Cardiovascular;  Laterality: N/A;   • CARDIAC CATHETERIZATION N/A 6/19/2021    Procedure: Coronary angiography;  Surgeon: Demond Miguel MD;  [facility];  Service: Cardiovascular;  Laterality: N/A;   • CARDIAC CATHETERIZATION N/A 6/19/2021    Procedure: Left ventriculography;  Surgeon: Demond Miguel MD;  [facility];  Service: Cardiovascular;  Laterality: N/A;   • CARDIAC ELECTROPHYSIOLOGY PROCEDURE N/A 6/22/2021    Procedure: EP Study, possible ablation of SVT  possible ICD;  Surgeon: Boy Dumont MD;  Location: [facility] CATH INVASIVE LOCATION;  Service: Cardiovascular;  Laterality: N/A;   • CARDIAC ELECTROPHYSIOLOGY PROCEDURE N/A 6/22/2021    Procedure: Device Implant: single chamber ICD;  Surgeon: Boy Dumont MD;  Location: [facility] INVASIVE LOCATION;  Service: Cardiovascular;  Laterality: N/A;   • TARSAL TUNNEL RELEASE         Social History     Socioeconomic History   • Marital status:    Tobacco Use   • Smoking status: Never   • Smokeless tobacco: Never   Vaping Use   • Vaping Use: Never used   Substance and Sexual Activity   • Alcohol use: Not Currently   • Drug use: Not Currently     Types: Marijuana   • Sexual activity: Defer       History reviewed. No pertinent family history.    Review of Systems   Constitutional: Negative for chills, fever and malaise/fatigue.   Cardiovascular: Negative for chest pain, dyspnea on exertion, leg swelling, near-syncope, orthopnea, palpitations, paroxysmal nocturnal dyspnea and syncope.   Respiratory: Negative for cough and shortness of breath.    Musculoskeletal: Negative for joint pain, joint swelling and myalgias.   Gastrointestinal: Negative for abdominal pain, diarrhea, melena, nausea and vomiting.   Genitourinary: Negative for frequency and hematuria.   Neurological: Negative for light-headedness,  "numbness, paresthesias and seizures.   Allergic/Immunologic: Negative.    All other systems reviewed and are negative.      No Known Allergies      Current Outpatient Medications:   •  metoprolol succinate XL (TOPROL-XL) 50 MG 24 hr tablet, Take 1 tablet by mouth Daily. (Patient taking differently: Take 1 tablet by mouth Daily. HAS NOT BEEN TAKING), Disp: 30 tablet, Rfl: 11  •  metoprolol succinate XL (TOPROL-XL) 50 MG 24 hr tablet, Take 1 tablet by mouth Daily., Disp: 30 tablet, Rfl: 11      Objective:     Vitals:    11/02/22 1047   BP: 136/88   Pulse: 66   Weight: 131 kg (288 lb)   Height: 180.3 cm (71\")     Body mass index is 40.17 kg/m².    PHYSICAL EXAM:    Vitals Reviewed.   General Appearance: No acute distress, well developed and well nourished.   Eyes: Conjunctiva and lids: No erythema, swelling, or discharge. Sclera non-icteric.   HENT: Atraumatic, normocephalic. External eyes, ears, and nose normal.   Respiratory: No signs of respiratory distress. Respiration rhythm and depth normal.   Clear to auscultation. No rales, crackles, rhonchi, or wheezing auscultated.   Cardiovascular:  Heart Rate and Rhythm: Normal, Heart Sounds: Normal S1 and S2. No S3 or S4 noted.  Murmurs: No murmurs noted. No rubs, thrills, or gallops.   Arterial Pulses:  Posterior tibialis and dorsalis pedis pulses normal.   Lower Extremities: No edema noted.  Gastrointestinal:  Abdomen soft, non-distended, non-tender.   Musculoskeletal: Normal movement of extremities  Skin: Warm and dry.   Psychiatric: Patient alert and oriented to person, place, and time. Speech and behavior appropriate. Normal mood and affect.       ECG 12 Lead    Date/Time: 11/2/2022 11:02 AM  Performed by: Sonia Berry APRN  Authorized by: Sonia Berry APRN   Comparison: compared with previous ECG   Similar to previous ECG  Rhythm: sinus rhythm  BPM: 66                Assessment:       Diagnosis Plan   1. Idiopathic ventricular tachycardia  ECG 12 Lead      2. " ICD (implantable cardioverter-defibrillator) in place  ECG 12 Lead             Plan:       1.-3.  Idiopathic VT, status post ICD.  Normal function, already paces less than 1%.  He had ran out of his metoprolol, I sent in a prescription and he will pick that up today and get started back on that.    Follow-up with Dr. Miguel as recommended and Dr. Dumont in 1 year with device check.    As always, it has been a pleasure to participate in your patient's care.      Sincerely,         CRUZ Crystal

## 2023-05-08 ENCOUNTER — TELEPHONE (OUTPATIENT)
Dept: CARDIOLOGY | Facility: CLINIC | Age: 43
End: 2023-05-08
Payer: COMMERCIAL

## 2023-05-08 NOTE — TELEPHONE ENCOUNTER
[place] patient of Dr. Miguel and Dr. Dumont.      FYI: Patient received ICD shock for VT yesterday, 5/7/23 while playing basketball.  A message has been sent to Dr. Dumont in a separate encounter to address.    Patient was last seen by Dr. LR on 7/12/21.  He was supposed to follow up in one year with echo.

## 2023-05-08 NOTE — TELEPHONE ENCOUNTER
Patient left voicemail this morning stating that while playing basketball yesterday he received a shock.    It was just one, and he is ok now.    He just wanted us to be aware, and make sure there isn't anything else that needs to be done.

## 2023-05-11 NOTE — PROGRESS NOTES
ICD shock  Also follows with Dr Dumont   Subjective:        Raymond Pascual is a 43 y.o. male who here for follow up    CC  VT AICD SHOCK  HPI  43-year-old male with AICD, ventricular tachycardia as well as a primary hypertension here for the follow-up with no complaints of chest pains tightness heaviness or the pressure sensation    Patient had 1 episode of ventricular tachycardia while playing the basketball successfully defibrillated     Problems Addressed this Visit        Cardiac and Vasculature    ICD (implantable cardioverter-defibrillator) in place    VT (ventricular tachycardia) - Primary    Relevant Orders    Treadmill Stress Test    Adult Transthoracic Echo Complete W/ Cont if Necessary Per Protocol    Primary hypertension   Diagnoses       Codes Comments    VT (ventricular tachycardia)    -  Primary ICD-10-CM: I47.20  ICD-9-CM: 427.1     ICD (implantable cardioverter-defibrillator) in place     ICD-10-CM: Z95.810  ICD-9-CM: V45.02     Primary hypertension     ICD-10-CM: I10  ICD-9-CM: 401.9         .    The following portions of the patient's history were reviewed and updated as appropriate: allergies, current medications, past family history, past medical history, past social history, past surgical history and problem list.    Past Medical History:   Diagnosis Date   • Cardiac arrest with ventricular fibrillation 06/2021   • S/P ICD (internal cardiac defibrillator) procedure     ICD placement     reports that he has never smoked. He has never used smokeless tobacco. He reports that he does not currently use alcohol. He reports that he does not currently use drugs after having used the following drugs: Marijuana. History reviewed. No pertinent family history.    Review of Systems  Constitutional: No wt loss, fever, fatigue  Gastrointestinal: No nausea, abdominal pain  Behavioral/Psych: No insomnia or anxiety   Cardiovascular no chest pains or tightness in the chest  Objective:       Physical Exam         "   Physical Exam  /98 (BP Location: Right arm)   Pulse (!) 49   Ht 180.3 cm (71\")   Wt 128 kg (281 lb 9.6 oz)   BMI 39.28 kg/m²     General appearance: No acute changes   Eyes: Sclerae conjunctivae normal, pupils reactive   HENT: Atraumatic; oropharynx clear with moist mucous membranes and no mucosal ulcerations;  Neck: Trachea midline; NECK, supple, no thyromegaly or lymphadenopathy   Lungs: Normal size and shape, normal breath sounds, equal distribution of air, no rales and rhonchi   CV: S1-S2 regular, no murmurs, no rub, no gallop   Abdomen: Soft, nontender; no masses , no abnormal abdominal sounds   Extremities: No deformity , normal color , no peripheral edema   Skin: Normal temperature, turgor and texture; no rash, ulcers  Psych: Appropriate affect, alert and oriented to person, place and time           Procedures      Echocardiogram:        Current Outpatient Medications:   •  metoprolol succinate XL (TOPROL-XL) 50 MG 24 hr tablet, Take 1 tablet by mouth Daily., Disp: 30 tablet, Rfl: 11   Assessment:        Patient Active Problem List   Diagnosis   • Cardiac arrest with ventricular fibrillation   • ICD (implantable cardioverter-defibrillator) in place   • VT (ventricular tachycardia)   • Class 2 obesity due to excess calories with body mass index (BMI) of 39.0 to 39.9 in adult   • Elevated blood pressure reading   • Primary hypertension               Plan:            ICD-10-CM ICD-9-CM   1. VT (ventricular tachycardia)  I47.20 427.1   2. ICD (implantable cardioverter-defibrillator) in place  Z95.810 V45.02   3. Primary hypertension  I10 401.9     1. VT (ventricular tachycardia)    1 episode of ventricular tachycardia approximately couple weeks ago requiring AICD shock    Considering the patient's symptoms as well as clinical situation and  EKG findings, along with cardiac risk factors, ischemic workup is necessary to rule out ischemic cardiomyopathy, stress induced arrhythmias, and functional " capacity for diagnosis as well as prognostic consideration    Considering patient's medical condition as well as the risk factors, patient will require echocardiogram for further evaluation for the LV function, four-chamber evaluation, including the pressures, valvular function and  pericardial disease and pericardial effusion    - Treadmill Stress Test  - Adult Transthoracic Echo Complete W/ Cont if Necessary Per Protocol    2. ICD (implantable cardioverter-defibrillator) in place  AICD functioning normally    3. Primary hypertension  Blood pressure under control       ETT , ECHO    FOLLOW UP    VT    CON BETA BLOCKERS    BP AT HOME 130/80  COUNSELING:    Raymond Talley was given to patient for the following topics: diagnostic results, risk factor reductions, impressions, risks and benefits of treatment options and importance of treatment compliance .       SMOKING COUNSELING:        Dictated using Dragon dictation

## 2023-05-15 ENCOUNTER — OFFICE VISIT (OUTPATIENT)
Dept: CARDIOLOGY | Facility: CLINIC | Age: 43
End: 2023-05-15
Payer: COMMERCIAL

## 2023-05-15 VITALS
DIASTOLIC BLOOD PRESSURE: 98 MMHG | HEIGHT: 71 IN | WEIGHT: 281.6 LBS | SYSTOLIC BLOOD PRESSURE: 145 MMHG | HEART RATE: 49 BPM | BODY MASS INDEX: 39.42 KG/M2

## 2023-05-15 DIAGNOSIS — I10 PRIMARY HYPERTENSION: ICD-10-CM

## 2023-05-15 DIAGNOSIS — I47.20 VT (VENTRICULAR TACHYCARDIA): Primary | ICD-10-CM

## 2023-05-15 DIAGNOSIS — Z95.810 ICD (IMPLANTABLE CARDIOVERTER-DEFIBRILLATOR) IN PLACE: ICD-10-CM

## 2023-05-15 PROCEDURE — 99214 OFFICE O/P EST MOD 30 MIN: CPT | Performed by: INTERNAL MEDICINE

## 2023-05-16 PROBLEM — I10 PRIMARY HYPERTENSION: Status: ACTIVE | Noted: 2023-05-16

## 2023-05-16 PROBLEM — I47.20 VT (VENTRICULAR TACHYCARDIA): Status: ACTIVE | Noted: 2021-07-26

## 2023-06-26 LAB
AORTIC DIMENSIONLESS INDEX: 0.5 (DI)
BH CV ECHO MEAS - ACS: 2.3 CM
BH CV ECHO MEAS - AO MAX PG: 7.3 MMHG
BH CV ECHO MEAS - AO MEAN PG: 4.2 MMHG
BH CV ECHO MEAS - AO ROOT DIAM: 2.43 CM
BH CV ECHO MEAS - AO V2 MAX: 134.7 CM/SEC
BH CV ECHO MEAS - AO V2 VTI: 32 CM
BH CV ECHO MEAS - AVA(I,D): 1.63 CM2
BH CV ECHO MEAS - EDV(CUBED): 67.6 ML
BH CV ECHO MEAS - EDV(MOD-SP2): 110 ML
BH CV ECHO MEAS - EDV(MOD-SP4): 103 ML
BH CV ECHO MEAS - EF(MOD-BP): 61.1 %
BH CV ECHO MEAS - EF(MOD-SP2): 60.9 %
BH CV ECHO MEAS - EF(MOD-SP4): 60.2 %
BH CV ECHO MEAS - ESV(CUBED): 22.2 ML
BH CV ECHO MEAS - ESV(MOD-SP2): 43 ML
BH CV ECHO MEAS - ESV(MOD-SP4): 41 ML
BH CV ECHO MEAS - FS: 31 %
BH CV ECHO MEAS - IVS/LVPW: 1.21 CM
BH CV ECHO MEAS - IVSD: 1.3 CM
BH CV ECHO MEAS - LA DIMENSION: 2.9 CM
BH CV ECHO MEAS - LAT PEAK E' VEL: 13.5 CM/SEC
BH CV ECHO MEAS - LV DIASTOLIC VOL/BSA (35-75): 42.3 CM2
BH CV ECHO MEAS - LV MASS(C)D: 167.6 GRAMS
BH CV ECHO MEAS - LV MAX PG: 2.8 MMHG
BH CV ECHO MEAS - LV MEAN PG: 1.07 MMHG
BH CV ECHO MEAS - LV SYSTOLIC VOL/BSA (12-30): 16.8 CM2
BH CV ECHO MEAS - LV V1 MAX: 83.9 CM/SEC
BH CV ECHO MEAS - LV V1 VTI: 15 CM
BH CV ECHO MEAS - LVIDD: 4.1 CM
BH CV ECHO MEAS - LVIDS: 2.8 CM
BH CV ECHO MEAS - LVOT AREA: 3.5 CM2
BH CV ECHO MEAS - LVOT DIAM: 2.1 CM
BH CV ECHO MEAS - LVPWD: 1.08 CM
BH CV ECHO MEAS - MED PEAK E' VEL: 8.5 CM/SEC
BH CV ECHO MEAS - MV A DUR: 0.13 SEC
BH CV ECHO MEAS - MV A MAX VEL: 63.1 CM/SEC
BH CV ECHO MEAS - MV DEC SLOPE: 273.8 CM/SEC2
BH CV ECHO MEAS - MV DEC TIME: 194 MSEC
BH CV ECHO MEAS - MV E MAX VEL: 75.2 CM/SEC
BH CV ECHO MEAS - MV E/A: 1.19
BH CV ECHO MEAS - MV MAX PG: 3.1 MMHG
BH CV ECHO MEAS - MV MEAN PG: 0.98 MMHG
BH CV ECHO MEAS - MV P1/2T: 88.3 MSEC
BH CV ECHO MEAS - MV V2 VTI: 30.8 CM
BH CV ECHO MEAS - MVA(P1/2T): 2.49 CM2
BH CV ECHO MEAS - MVA(VTI): 1.69 CM2
BH CV ECHO MEAS - PA ACC TIME: 0.19 SEC
BH CV ECHO MEAS - PA V2 MAX: 77.4 CM/SEC
BH CV ECHO MEAS - PULM A REVS DUR: 0.15 SEC
BH CV ECHO MEAS - PULM A REVS VEL: 23 CM/SEC
BH CV ECHO MEAS - PULM DIAS VEL: 35.5 CM/SEC
BH CV ECHO MEAS - PULM S/D: 0.98
BH CV ECHO MEAS - PULM SYS VEL: 34.9 CM/SEC
BH CV ECHO MEAS - QP/QS: 2.39
BH CV ECHO MEAS - RAP SYSTOLE: 3 MMHG
BH CV ECHO MEAS - RV MAX PG: 1.47 MMHG
BH CV ECHO MEAS - RV V1 MAX: 60.6 CM/SEC
BH CV ECHO MEAS - RV V1 VTI: 15.8 CM
BH CV ECHO MEAS - RVDD: 3.1 CM
BH CV ECHO MEAS - RVOT DIAM: 3.2 CM
BH CV ECHO MEAS - RVSP: 29.1 MMHG
BH CV ECHO MEAS - SI(MOD-SP2): 27.5 ML/M2
BH CV ECHO MEAS - SI(MOD-SP4): 25.4 ML/M2
BH CV ECHO MEAS - SV(LVOT): 52 ML
BH CV ECHO MEAS - SV(MOD-SP2): 67 ML
BH CV ECHO MEAS - SV(MOD-SP4): 62 ML
BH CV ECHO MEAS - SV(RVOT): 124.2 ML
BH CV ECHO MEAS - TAPSE (>1.6): 3 CM
BH CV ECHO MEAS - TR MAX PG: 26.1 MMHG
BH CV ECHO MEAS - TR MAX VEL: 255.4 CM/SEC
BH CV ECHO MEASUREMENTS AVERAGE E/E' RATIO: 6.84
BH CV XLRA - RV BASE: 4.4 CM
BH CV XLRA - RV LENGTH: 6.5 CM
BH CV XLRA - RV MID: 3.1 CM
BH CV XLRA - TDI S': 15.4 CM/SEC
LEFT ATRIUM VOLUME INDEX: 15 ML/M2
MAXIMAL PREDICTED HEART RATE: 178 BPM
SINUS: 2.6 CM
STJ: 3 CM
STRESS TARGET HR: 151 BPM

## 2023-06-29 LAB
BH CV STRESS BP STAGE 1: NORMAL
BH CV STRESS BP STAGE 2: NORMAL
BH CV STRESS BP STAGE 3: NORMAL
BH CV STRESS BP STAGE 4: NORMAL
BH CV STRESS DURATION MIN STAGE 1: 3
BH CV STRESS DURATION MIN STAGE 2: 3
BH CV STRESS DURATION MIN STAGE 3: 3
BH CV STRESS DURATION SEC STAGE 1: 30
BH CV STRESS DURATION SEC STAGE 2: 0
BH CV STRESS DURATION SEC STAGE 3: 0
BH CV STRESS DURATION SEC STAGE 4: 51
BH CV STRESS GRADE STAGE 1: 10
BH CV STRESS GRADE STAGE 2: 12
BH CV STRESS GRADE STAGE 3: 14
BH CV STRESS GRADE STAGE 4: 16
BH CV STRESS HR STAGE 1: 114
BH CV STRESS HR STAGE 2: 140
BH CV STRESS HR STAGE 3: 150
BH CV STRESS HR STAGE 4: 171
BH CV STRESS METS STAGE 1: 4.6
BH CV STRESS METS STAGE 2: 7.1
BH CV STRESS METS STAGE 3: 10.2
BH CV STRESS METS STAGE 4: 11.5
BH CV STRESS PROTOCOL 1: NORMAL
BH CV STRESS RECOVERY BP: NORMAL MMHG
BH CV STRESS RECOVERY HR: 92 BPM
BH CV STRESS SPEED STAGE 1: 1.7
BH CV STRESS SPEED STAGE 2: 2.5
BH CV STRESS SPEED STAGE 3: 3.4
BH CV STRESS SPEED STAGE 4: 4.2
BH CV STRESS STAGE 1: 1
BH CV STRESS STAGE 2: 2
BH CV STRESS STAGE 3: 3
BH CV STRESS STAGE 4: 4
MAXIMAL PREDICTED HEART RATE: 178 BPM
PERCENT MAX PREDICTED HR: 96.07 %
STRESS BASELINE BP: NORMAL MMHG
STRESS BASELINE HR: 60 BPM
STRESS O2 SAT REST: 99 %
STRESS PERCENT HR: 113 %
STRESS POST ESTIMATED WORKLOAD: 11.5 METS
STRESS POST EXERCISE DUR MIN: 10 MIN
STRESS POST EXERCISE DUR SEC: 21 SEC
STRESS POST PEAK BP: NORMAL MMHG
STRESS POST PEAK HR: 171 BPM
STRESS TARGET HR: 151 BPM

## 2023-09-11 ENCOUNTER — OFFICE VISIT (OUTPATIENT)
Dept: CARDIOLOGY | Facility: CLINIC | Age: 43
End: 2023-09-11
Payer: COMMERCIAL

## 2023-09-11 VITALS
SYSTOLIC BLOOD PRESSURE: 126 MMHG | HEIGHT: 71 IN | WEIGHT: 288 LBS | BODY MASS INDEX: 40.32 KG/M2 | DIASTOLIC BLOOD PRESSURE: 87 MMHG | HEART RATE: 55 BPM

## 2023-09-11 DIAGNOSIS — I47.20 VT (VENTRICULAR TACHYCARDIA): Primary | ICD-10-CM

## 2023-09-11 DIAGNOSIS — Z95.810 ICD (IMPLANTABLE CARDIOVERTER-DEFIBRILLATOR) IN PLACE: ICD-10-CM

## 2023-09-11 DIAGNOSIS — R42 DIZZINESS: ICD-10-CM

## 2023-09-11 PROCEDURE — 99213 OFFICE O/P EST LOW 20 MIN: CPT | Performed by: INTERNAL MEDICINE

## 2023-09-11 NOTE — PROGRESS NOTES
STRESS TEST RESULTS   Subjective:        Raymond Pascual is a 43 y.o. male who here for follow up    CC  RANDOM TINGLING HAND  DIZZINESS  HPI  43-year-old male with ICD ventricular tachycardia has been having random tingling in hands and dizziness     Problems Addressed this Visit          Cardiac and Vasculature    ICD (implantable cardioverter-defibrillator) in place    VT (ventricular tachycardia) - Primary    Relevant Orders    Adult Transthoracic Echo Complete W/ Cont if Necessary Per Protocol       Symptoms and Signs    Dizziness     Diagnoses         Codes Comments    VT (ventricular tachycardia)    -  Primary ICD-10-CM: I47.20  ICD-9-CM: 427.1     Dizziness     ICD-10-CM: R42  ICD-9-CM: 780.4     ICD (implantable cardioverter-defibrillator) in place     ICD-10-CM: Z95.810  ICD-9-CM: V45.02           .    The following portions of the patient's history were reviewed and updated as appropriate: allergies, current medications, past family history, past medical history, past social history, past surgical history and problem list.    Past Medical History:   Diagnosis Date    Cardiac arrest with ventricular fibrillation 06/2021    Hypertension     at age 17 on meds for 1 -2 yr. lost 30-40 LBS and no longer need meds    S/P ICD (internal cardiac defibrillator) procedure     ICD placement     reports that he has never smoked. He has never used smokeless tobacco. He reports that he does not currently use alcohol. He reports that he does not currently use drugs after having used the following drugs: Marijuana.   Family History   Problem Relation Age of Onset    Hypertension Mother     Heart disease Father         passed at age 38    Hypertension Father     Diabetes Father        Review of Systems  Constitutional: No wt loss, fever, fatigue  Gastrointestinal: No nausea, abdominal pain  Behavioral/Psych: No insomnia or anxiety   Cardiovascular tingling in hands  Objective:       Physical Exam           Physical Exam  BP  "126/87 (BP Location: Left arm, Patient Position: Sitting, Cuff Size: Adult)   Pulse 55   Ht 180.3 cm (71\")   Wt 131 kg (288 lb)   BMI 40.17 kg/m²     General appearance: No acute changes   Eyes: Sclerae conjunctivae normal, pupils reactive   HENT: Atraumatic; oropharynx clear with moist mucous membranes and no mucosal ulcerations;  Neck: Trachea midline; NECK, supple, no thyromegaly or lymphadenopathy   Lungs: Normal size and shape, normal breath sounds, equal distribution of air, no rales and rhonchi   CV: S1-S2 regular, no murmurs, no rub, no gallop   Abdomen: Soft, nontender; no masses , no abnormal abdominal sounds   Extremities: No deformity , normal color , no peripheral edema   Skin: Normal temperature, turgor and texture; no rash, ulcers  Psych: Appropriate affect, alert and oriented to person, place and time           Procedures      Echocardiogram:        Current Outpatient Medications:     metoprolol succinate XL (TOPROL-XL) 50 MG 24 hr tablet, Take 1 tablet by mouth Daily., Disp: 30 tablet, Rfl: 11   Assessment:        Patient Active Problem List   Diagnosis    Cardiac arrest with ventricular fibrillation    ICD (implantable cardioverter-defibrillator) in place    VT (ventricular tachycardia)    Class 2 obesity due to excess calories with body mass index (BMI) of 39.0 to 39.9 in adult    Elevated blood pressure reading    Primary hypertension    Dizziness               Plan:            ICD-10-CM ICD-9-CM   1. VT (ventricular tachycardia)  I47.20 427.1   2. Dizziness  R42 780.4   3. ICD (implantable cardioverter-defibrillator) in place  Z95.810 V45.02     1. VT (ventricular tachycardia)  Considering patient's medical condition as well as the risk factors, patient will require echocardiogram for further evaluation for the LV function, four-chamber evaluation, including the pressures, valvular function and  pericardial disease and pericardial effusion    - Adult Transthoracic Echo Complete W/ Cont if " Necessary Per Protocol; Future    2. Dizziness  Considering patient's medical condition as well as the risk factors, patient will require echocardiogram for further evaluation for the LV function, four-chamber evaluation, including the pressures, valvular function and  pericardial disease and pericardial effusion      3. ICD (implantable cardioverter-defibrillator) in place  Stable        1 YR  WITH ECHO  COUNSELING:    Raymond Talley was given to patient for the following topics: diagnostic results, risk factor reductions, impressions, risks and benefits of treatment options and importance of treatment compliance .       SMOKING COUNSELING:        Dictated using Dragon dictation

## 2023-09-26 PROBLEM — R42 DIZZINESS: Status: ACTIVE | Noted: 2023-09-26

## 2023-10-30 ENCOUNTER — TELEPHONE (OUTPATIENT)
Dept: CARDIOLOGY | Facility: CLINIC | Age: 43
End: 2023-10-30
Payer: COMMERCIAL

## 2023-10-30 NOTE — TELEPHONE ENCOUNTER
Remote transmission reviewed.  Patient received one shock on 10/27/23 at 1943 for VT.    Attempted to call patient to assess symptoms.  LVM.

## 2023-11-07 ENCOUNTER — APPOINTMENT (OUTPATIENT)
Dept: GENERAL RADIOLOGY | Facility: HOSPITAL | Age: 43
End: 2023-11-07
Payer: COMMERCIAL

## 2023-11-07 VITALS
WEIGHT: 290 LBS | BODY MASS INDEX: 40.6 KG/M2 | TEMPERATURE: 98.4 F | HEART RATE: 63 BPM | HEIGHT: 71 IN | SYSTOLIC BLOOD PRESSURE: 150 MMHG | DIASTOLIC BLOOD PRESSURE: 100 MMHG | OXYGEN SATURATION: 98 % | RESPIRATION RATE: 16 BRPM

## 2023-11-07 LAB
ALBUMIN SERPL-MCNC: 4.4 G/DL (ref 3.5–5.2)
ALBUMIN/GLOB SERPL: 1.7 G/DL
ALP SERPL-CCNC: 76 U/L (ref 39–117)
ALT SERPL W P-5'-P-CCNC: 14 U/L (ref 1–41)
ANION GAP SERPL CALCULATED.3IONS-SCNC: 7 MMOL/L (ref 5–15)
AST SERPL-CCNC: 25 U/L (ref 1–40)
BASOPHILS # BLD AUTO: 0.07 10*3/MM3 (ref 0–0.2)
BASOPHILS NFR BLD AUTO: 1 % (ref 0–1.5)
BILIRUB SERPL-MCNC: 0.4 MG/DL (ref 0–1.2)
BUN SERPL-MCNC: 17 MG/DL (ref 6–20)
BUN/CREAT SERPL: 13 (ref 7–25)
CALCIUM SPEC-SCNC: 8.9 MG/DL (ref 8.6–10.5)
CHLORIDE SERPL-SCNC: 101 MMOL/L (ref 98–107)
CO2 SERPL-SCNC: 30 MMOL/L (ref 22–29)
CREAT SERPL-MCNC: 1.31 MG/DL (ref 0.76–1.27)
DEPRECATED RDW RBC AUTO: 41.5 FL (ref 37–54)
EGFRCR SERPLBLD CKD-EPI 2021: 69.7 ML/MIN/1.73
EOSINOPHIL # BLD AUTO: 0.16 10*3/MM3 (ref 0–0.4)
EOSINOPHIL NFR BLD AUTO: 2.3 % (ref 0.3–6.2)
ERYTHROCYTE [DISTWIDTH] IN BLOOD BY AUTOMATED COUNT: 14 % (ref 12.3–15.4)
GLOBULIN UR ELPH-MCNC: 2.6 GM/DL
GLUCOSE SERPL-MCNC: 102 MG/DL (ref 65–99)
HCT VFR BLD AUTO: 47.9 % (ref 37.5–51)
HGB BLD-MCNC: 15.5 G/DL (ref 13–17.7)
HOLD SPECIMEN: NORMAL
HOLD SPECIMEN: NORMAL
IMM GRANULOCYTES # BLD AUTO: 0.02 10*3/MM3 (ref 0–0.05)
IMM GRANULOCYTES NFR BLD AUTO: 0.3 % (ref 0–0.5)
LIPASE SERPL-CCNC: 38 U/L (ref 13–60)
LYMPHOCYTES # BLD AUTO: 2.8 10*3/MM3 (ref 0.7–3.1)
LYMPHOCYTES NFR BLD AUTO: 39.5 % (ref 19.6–45.3)
MCH RBC QN AUTO: 26.7 PG (ref 26.6–33)
MCHC RBC AUTO-ENTMCNC: 32.4 G/DL (ref 31.5–35.7)
MCV RBC AUTO: 82.4 FL (ref 79–97)
MONOCYTES # BLD AUTO: 0.56 10*3/MM3 (ref 0.1–0.9)
MONOCYTES NFR BLD AUTO: 7.9 % (ref 5–12)
NEUTROPHILS NFR BLD AUTO: 3.47 10*3/MM3 (ref 1.7–7)
NEUTROPHILS NFR BLD AUTO: 49 % (ref 42.7–76)
NRBC BLD AUTO-RTO: 0 /100 WBC (ref 0–0.2)
NT-PROBNP SERPL-MCNC: <36 PG/ML (ref 0–450)
PLATELET # BLD AUTO: 239 10*3/MM3 (ref 140–450)
PMV BLD AUTO: 10.2 FL (ref 6–12)
POTASSIUM SERPL-SCNC: 4.5 MMOL/L (ref 3.5–5.2)
PROT SERPL-MCNC: 7 G/DL (ref 6–8.5)
RBC # BLD AUTO: 5.81 10*6/MM3 (ref 4.14–5.8)
SODIUM SERPL-SCNC: 138 MMOL/L (ref 136–145)
TROPONIN T SERPL HS-MCNC: 7 NG/L
WBC NRBC COR # BLD: 7.08 10*3/MM3 (ref 3.4–10.8)
WHOLE BLOOD HOLD COAG: NORMAL
WHOLE BLOOD HOLD SPECIMEN: NORMAL

## 2023-11-07 PROCEDURE — 99284 EMERGENCY DEPT VISIT MOD MDM: CPT

## 2023-11-07 PROCEDURE — 83880 ASSAY OF NATRIURETIC PEPTIDE: CPT | Performed by: EMERGENCY MEDICINE

## 2023-11-07 PROCEDURE — 93005 ELECTROCARDIOGRAM TRACING: CPT | Performed by: EMERGENCY MEDICINE

## 2023-11-07 PROCEDURE — 93005 ELECTROCARDIOGRAM TRACING: CPT

## 2023-11-07 PROCEDURE — 71045 X-RAY EXAM CHEST 1 VIEW: CPT

## 2023-11-07 PROCEDURE — 85025 COMPLETE CBC W/AUTO DIFF WBC: CPT | Performed by: EMERGENCY MEDICINE

## 2023-11-07 PROCEDURE — 80053 COMPREHEN METABOLIC PANEL: CPT | Performed by: EMERGENCY MEDICINE

## 2023-11-07 PROCEDURE — 84484 ASSAY OF TROPONIN QUANT: CPT | Performed by: EMERGENCY MEDICINE

## 2023-11-07 PROCEDURE — 83690 ASSAY OF LIPASE: CPT | Performed by: EMERGENCY MEDICINE

## 2023-11-07 RX ORDER — ASPIRIN 81 MG/1
324 TABLET, CHEWABLE ORAL ONCE
Status: COMPLETED | OUTPATIENT
Start: 2023-11-07 | End: 2023-11-07

## 2023-11-07 RX ORDER — SODIUM CHLORIDE 0.9 % (FLUSH) 0.9 %
10 SYRINGE (ML) INJECTION AS NEEDED
Status: DISCONTINUED | OUTPATIENT
Start: 2023-11-07 | End: 2023-11-08 | Stop reason: HOSPADM

## 2023-11-07 RX ADMIN — ASPIRIN 81 MG CHEWABLE TABLET 324 MG: 81 TABLET CHEWABLE at 22:16

## 2023-11-08 ENCOUNTER — HOSPITAL ENCOUNTER (EMERGENCY)
Facility: HOSPITAL | Age: 43
Discharge: HOME | End: 2023-11-08
Attending: EMERGENCY MEDICINE
Payer: COMMERCIAL

## 2023-11-08 DIAGNOSIS — I46.9 CARDIAC ARREST WITH VENTRICULAR FIBRILLATION: ICD-10-CM

## 2023-11-08 DIAGNOSIS — I49.01 CARDIAC ARREST WITH VENTRICULAR FIBRILLATION: ICD-10-CM

## 2023-11-08 DIAGNOSIS — Z95.810 ICD (IMPLANTABLE CARDIOVERTER-DEFIBRILLATOR) IN PLACE: ICD-10-CM

## 2023-11-08 DIAGNOSIS — R42 DIZZINESS: Primary | ICD-10-CM

## 2023-11-08 LAB
GEN 5 2HR TROPONIN T REFLEX: 6 NG/L
HOLD SPECIMEN: NORMAL
TROPONIN T DELTA: -1 NG/L

## 2023-11-08 PROCEDURE — 36415 COLL VENOUS BLD VENIPUNCTURE: CPT

## 2023-11-08 PROCEDURE — 84484 ASSAY OF TROPONIN QUANT: CPT | Performed by: EMERGENCY MEDICINE

## 2023-11-08 PROCEDURE — 93005 ELECTROCARDIOGRAM TRACING: CPT

## 2023-11-08 NOTE — ED PROVIDER NOTES
Subjective   History of Present Illness  Is a 43-year-old male presented to the emergency department with some lightheadedness.  The patient does have extensive cardiac history including cardiac arrest from ventricular fibrillation.  Patient had a defibrillator placed about a year ago secondary to this.  The patient states that he has only had 1 defibrillation since then.  It was about 6 months ago.  Patient states that he was at the gym and started getting a little lightheaded.  He states it felt very similar to the last time so he wanted to get checked out.  Patient states that he sat down and symptoms resolved.  At this time he is asymptomatic.  Is not having any fevers or chills.  No headache or change in vision.  No focal weakness.  No chest pain or shortness of breath.  No abdominal pain or vomiting.    History provided by:  Patient   used: No        Review of Systems   Constitutional:  Negative for chills and fever.   HENT:  Negative for congestion, ear pain and sore throat.    Eyes:  Negative for visual disturbance.   Respiratory:  Negative for shortness of breath.    Cardiovascular:  Negative for chest pain.   Gastrointestinal:  Negative for abdominal pain.   Genitourinary:  Negative for difficulty urinating.   Musculoskeletal:  Negative for arthralgias.   Skin:  Negative for rash.   Neurological:  Positive for light-headedness. Negative for dizziness, weakness and numbness.   Psychiatric/Behavioral:  Negative for agitation.        Past Medical History:   Diagnosis Date    Cardiac arrest with ventricular fibrillation 06/2021    Hypertension     at age 17 on meds for 1 -2 yr. lost 30-40 LBS and no longer need meds    S/P ICD (internal cardiac defibrillator) procedure     ICD placement       No Known Allergies    Past Surgical History:   Procedure Laterality Date    APPENDECTOMY      CARDIAC CATHETERIZATION N/A 06/19/2021    Procedure: Left Heart Cath;  Surgeon: Demond Miguel MD;   Location:  [facility] CATH INVASIVE LOCATION;  Service: Cardiovascular;  Laterality: N/A;    CARDIAC CATHETERIZATION N/A 06/19/2021    Procedure: Coronary angiography;  Surgeon: Demond Miguel MD;  [facility];  Service: Cardiovascular;  Laterality: N/A;    CARDIAC CATHETERIZATION N/A 06/19/2021    Procedure: Left ventriculography;  Surgeon: Demond Miguel MD;  Location: [facility] CATH INVASIVE LOCATION;  Service: Cardiovascular;  Laterality: N/A;    CARDIAC DEFIBRILLATOR PLACEMENT  06/2021    CARDIAC ELECTROPHYSIOLOGY PROCEDURE N/A 06/22/2021    Procedure: EP Study, possible ablation of SVT  possible ICD;  Surgeon: Boy Dumont MD;  Location: [facility] CATH INVASIVE LOCATION;  Service: Cardiovascular;  Laterality: N/A;    CARDIAC ELECTROPHYSIOLOGY PROCEDURE N/A 06/22/2021    Procedure: Device Implant: single chamber ICD;  Surgeon: Boy Dumont MD;  Location: [facility] CATH INVASIVE LOCATION;  Service: Cardiovascular;  Laterality: N/A;    TARSAL TUNNEL RELEASE         Family History   Problem Relation Age of Onset    Hypertension Mother     Heart disease Father         passed at age 38    Hypertension Father     Diabetes Father        Social History     Socioeconomic History    Marital status:    Tobacco Use    Smoking status: Never     Passive exposure: Never    Smokeless tobacco: Never   Vaping Use    Vaping Use: Never used   Substance and Sexual Activity    Alcohol use: Not Currently     Comment: Rare    Drug use: Not Currently     Types: Marijuana    Sexual activity: Defer           Objective   Physical Exam  Vitals and nursing note reviewed.   Constitutional:       General: He is not in acute distress.     Appearance: He is not ill-appearing or toxic-appearing.   HENT:      Mouth/Throat:      Pharynx: No posterior oropharyngeal erythema.   Eyes:      Extraocular Movements: Extraocular movements intact.      Pupils: Pupils are equal, round, and reactive to light.    Cardiovascular:      Rate and Rhythm: Normal rate and regular rhythm.   Pulmonary:      Effort: Pulmonary effort is normal. No respiratory distress.   Abdominal:      General: Abdomen is flat. There is no distension.      Palpations: There is no mass.      Tenderness: There is no abdominal tenderness. There is no guarding or rebound.   Musculoskeletal:         General: No deformity. Normal range of motion.   Neurological:      General: No focal deficit present.      Mental Status: He is alert.      Sensory: No sensory deficit.      Motor: No weakness.         ECG 12 Lead ED Triage Standing Order; Chest Pain      Date/Time: 11/7/2023 9:40 PM    Performed by: Aaron Trevino MD  Authorized by: Aaron Trevino MD  Interpreted by physician  Comparison: compared with previous ECG   Similar to previous ECG  Rhythm: sinus rhythm  Rate: normal  BPM: 61  QRS axis: normal  Conduction: conduction normal  ST Segments: ST segments normal  Other: no other findings  Clinical impression: normal ECG  Comments: EKG was directly visualized by myself, interpretations as documented in hospital course.               ED Course  ED Course as of 11/08/23 0124 Tue Nov 07, 2023 2142 BP: 150/100 [JK]   2142 Temp: 98.4 °F (36.9 °C) [JK]   2142 Temp src: Oral [JK]   2142 Heart Rate: 63 [JK]   2142 Resp: 16 [JK]   2142 SpO2: 98 % [JK]   2142 Device (Oxygen Therapy): room air  Patient's repeat vitals, telemetry tracing, and pulse oximetry tracing were directly viewed and interpreted by myself.  Patient is hemodynamically stable [JK]   Wed Nov 08, 2023   0122 Lipase [JK]   0122 BNP [JK]   0122 CBC & Differential(!) [JK]   0122 Comprehensive Metabolic Panel(!) [JK]   0122 High Sensitivity Troponin T 2Hr [JK]   0122 High Sensitivity Troponin T  Laboratory studies were reviewed and interpreted directly by myself.  CBC was normal, BMP normal, lipase normal, CMP showed some minor acute kidney injury with creatinine 1.31, initial troponin  was 7, repeat was 6 with a delta Of -1 [JK]   0123 XR Chest 1 View  Imaging was directly visualized by myself, per my interpretations, chest x-ray was normal. [JK]   0123 On reevaluation, patient is feeling better.  Is not had any dysrhythmia or episodes while in the emergency department.  Patient will be given cardiology follow-up.  Given strict return precautions.  Verbalized understanding. [JK]   0123 I had a discussion with the patient/family regarding diagnosis, diagnostic results, treatment plan, and medications. The patient/family indicated understanding of these instructions. I spent adequate time at the bedside prior to discharge necessary to discuss the aftercare instructions, giving patient education, providing explanations of the results of our evaluations/findings, and my decision making to assure that the patient/family understand the plan of care. Time was allotted to answer questions at that time and throughout the ED course. Patient is required to maintain timely follow up, as discussed. I also discussed the potential for the development of an acute emergent condition requiring further evaluation, return to the ER, admission, or even surgical intervention.  I encouraged the patient to return to the emergency department immediately for any concerns, worsening symptoms, new complaints, or if symptoms persist and they are unable to seek follow-up in a timely fashion. The patient/family expressed understanding and agreement with this plan    Shared decision making:   After full review of the patient's clinical presentation, review of any work-up including but not limited to laboratory studies and radiology obtained, I had a discussion with the patient.  Treatment options were discussed as well as the risks, benefits and consequences.  I discussed all findings with the patient and family members if available.  During the discussion, treatment goals were understood by all as well as any misconceptions  which were addressed with the patient.  Ample time was given for any questions they may have had.  They are in agreement with the treatment plan as well as final disposition. [JK]      ED Course User Index  [JK] Aaron Trevino MD                                           Medical Decision Making  This is a 43-year-old male presented to the emergency department with an episode of lightheadedness.  Patient does have extensive cardiac history including cardiac arrest from ventricular fibrillation.  Patient has not had any issues with his ICD.  No discharges.  Seems like this was an exertional episode of near syncope.  At this time he is hemodynamically stable.  His neurologic exam appears appropriate.  His EKG is normal.  IV access will be established and the patient.  To be placed on continuous telemetry monitoring.  Work-up initiated.      Differential diagnosis: Dysrhythmia, palpitations, acute kidney injury, electrolyte abnormality, CAD      Amount and/or Complexity of Data Reviewed  External Data Reviewed: labs, radiology, ECG and notes.     Details: External laboratories, imaging as well as notes were reviewed personally by myself.  All relevant studies were used to guide decision making.     Date of previous record: 6/19/2021    Source of note: Admission record    Summary: Patient was admitted in [place] secondary to a cardiac arrest from ventricular fibrillation.  Does have extensive records on file including laboratory studies, chest x-ray, echocardiogram, EP evaluation and multiple EKGs.  Records reviewed.    Labs: ordered. Decision-making details documented in ED Course.  Radiology: ordered and independent interpretation performed. Decision-making details documented in ED Course.  ECG/medicine tests: ordered and independent interpretation performed. Decision-making details documented in ED Course.    Risk  OTC drugs.  Prescription drug management.        Final diagnoses:   Dizziness   ICD  (implantable cardioverter-defibrillator) in place   Cardiac arrest with ventricular fibrillation       ED Disposition  ED Disposition       ED Disposition   Discharge    Condition   Stable    Comment   --               [facility] CARDIOLOGY  [address]  [address]  [facility] [address] [phone]  Call in 1 day           Medication List      No changes were made to your prescriptions during this visit.            Aaron Trevino MD  11/08/23 0124

## 2023-11-09 ENCOUNTER — OFFICE VISIT (OUTPATIENT)
Dept: CARDIOLOGY | Facility: HOSPITAL | Age: 43
End: 2023-11-09
Payer: COMMERCIAL

## 2023-11-09 VITALS
DIASTOLIC BLOOD PRESSURE: 80 MMHG | HEIGHT: 71 IN | TEMPERATURE: 97.5 F | BODY MASS INDEX: 41.44 KG/M2 | OXYGEN SATURATION: 99 % | WEIGHT: 296 LBS | RESPIRATION RATE: 16 BRPM | HEART RATE: 65 BPM | SYSTOLIC BLOOD PRESSURE: 139 MMHG

## 2023-11-09 DIAGNOSIS — R42 DIZZINESS: Primary | ICD-10-CM

## 2023-11-09 DIAGNOSIS — I46.9 CARDIAC ARREST WITH VENTRICULAR FIBRILLATION: ICD-10-CM

## 2023-11-09 DIAGNOSIS — Z95.810 ICD (IMPLANTABLE CARDIOVERTER-DEFIBRILLATOR) IN PLACE: ICD-10-CM

## 2023-11-09 DIAGNOSIS — I49.01 CARDIAC ARREST WITH VENTRICULAR FIBRILLATION: ICD-10-CM

## 2023-11-09 DIAGNOSIS — I10 PRIMARY HYPERTENSION: ICD-10-CM

## 2023-11-09 NOTE — PROGRESS NOTES
"Chief Complaint  V-FIB and Cardiac Arrest    Subjective      History of Present Illness {CC  Problem List  Visit  Diagnosis   Encounters  Notes  Medications  Labs  Result Review Imaging  Media :23}     Raymond Pascual, 43 y.o. male with past medical history significant for cardiac arrest with ventricular fibrillation status post ICD placement, and hypertension, who presents to [facility] for V-FIB and Cardiac Arrest.  Patient presented to the ED on 11/8/2023 with chief complaint of lightheadedness.  At that time, patient stated his only shock from his AICD occurred approximately 6 months ago.  Upon arrival to the ED, EKG showed sinus bradycardia, rate 53, otherwise normal ECG.  Chest x-ray showed no evidence of active chest disease.  CMP revealed mild elevation in creatinine of 1.31.  Other lab work including high-sensitivity troponin x2, CBC, BNP, and lipase, all unremarkable.    Since time of evaluation in ED, he has not had any further episodes of near syncope. When asked about most recent episode while at the gym, he was walking at the gym and was trying to \"take it easy\". He became short of air during first few minutes of exercise and decided to change what he was doing. He then moved to weights when he began to feel dizzy. He states dizziness was coming and going in waves.  He initially thought that the dizziness had resolved but later had ongoing dizziness while driving.  He initially presented to the urgent care center for evaluation.  Based on his history and symptoms he was instructed to go to the emergency room which he did.  He is unsure if he had chest pain prior to this episode, but states he may have had mild chest discomfort during episode. He denies syncope, lower extremity edema, and fatigue. Patient states recently, he has been having some shortness of air, and palpitations.     He is trying to be more active recently, and is attempting to exercise, however he is " "nervous to do so since he has been delivered shocks while active.     No reported caffeine intake  Water intake inadequate  ETOH very rare    Father with heart disease,  at 38    Of note, patient previously has been followed by Dr. Olson in [place].  He was last seen by his primary cardiologist on 2023, at which time provider ordered an echocardiogram to be completed in 1 year.    Echocardiogram 2023:    Left ventricular ejection fraction appears to be 61 - 65%.    Left ventricular diastolic function was normal.    Estimated right ventricular systolic pressure from tricuspid regurgitation is normal (<35 mmHg).    Stress test 2023:    Blood pressure demonstrated a hypertensive response to stress.    No ECG evidence of myocardial ischemia.    Negative clinical evidence of myocardial ischemia.    Findings consistent with a normal ECG stress test.    No ECG evidence of myocardial ischemia. Negative clinical evidence of myocardial ischemia. Findings consistent with a normal ECG stress test.      Objective     Vital Signs:   Vitals:    23 1506 23 1507 23 1508   BP: 133/72 129/93 139/80   BP Location: Right arm Left arm Left arm   Patient Position: Sitting Standing Sitting   Cuff Size: Adult Adult Adult   Pulse: 66 76 65   Resp:   16   Temp: 97.5 °F (36.4 °C) 97.5 °F (36.4 °C) 97.5 °F (36.4 °C)   TempSrc:   Temporal   SpO2: 99% 99% 99%   Weight:   134 kg (296 lb)   Height:   180.3 cm (71\")     Body mass index is 41.28 kg/m².  Physical Exam  Vitals and nursing note reviewed.   Constitutional:       Appearance: Normal appearance. He is obese.   HENT:      Head: Normocephalic.   Eyes:      Pupils: Pupils are equal, round, and reactive to light.   Cardiovascular:      Rate and Rhythm: Normal rate and regular rhythm.      Pulses: Normal pulses.      Heart sounds: Normal heart sounds. No murmur heard.  Pulmonary:      Effort: Pulmonary effort is normal.      Breath sounds: " Normal breath sounds.   Abdominal:      General: Bowel sounds are normal.      Palpations: Abdomen is soft.   Musculoskeletal:         General: Normal range of motion.      Cervical back: Normal range of motion.      Right lower leg: No edema.      Left lower leg: No edema.   Skin:     General: Skin is warm and dry.      Capillary Refill: Capillary refill takes less than 2 seconds.   Neurological:      Mental Status: He is alert and oriented to person, place, and time.   Psychiatric:         Mood and Affect: Mood normal.         Thought Content: Thought content normal.                Data Reviewed:{ Labs  Result Review  Imaging  Med Tab  Media :23}   Lab Results   Component Value Date    WBC 7.08 11/07/2023    HGB 15.5 11/07/2023    HCT 47.9 11/07/2023    MCV 82.4 11/07/2023     11/07/2023      Lab Results   Component Value Date    GLUCOSE 102 (H) 11/07/2023    BUN 17 11/07/2023    CREATININE 1.31 (H) 11/07/2023    EGFR 69.7 11/07/2023    BCR 13.0 11/07/2023    K 4.5 11/07/2023    CO2 30.0 (H) 11/07/2023    CALCIUM 8.9 11/07/2023    ALBUMIN 4.4 11/07/2023    BILITOT 0.4 11/07/2023    AST 25 11/07/2023    ALT 14 11/07/2023      Lab Results   Component Value Date    TROPONINT 6 11/08/2023      ECG 12 Lead ED Triage Standing Order; Chest Pain (11/08/2023 00:35)  ECG 12 Lead ED Triage Standing Order; Chest Pain (11/07/2023 21:05)  Adult Transthoracic Echo Complete W/ Cont if Necessary Per Protocol (09/11/2023 14:10)  SCANNED - STRESS TEST (06/26/2023)    Assessment & Plan   Assessment and Plan {CC Problem List  Visit Diagnosis  ROS  Review (Popup)  Health Maintenance  Quality  BestPractice  Medications  SmartSets  SnapShot Encounters  Media :23}     1. Dizziness  -Recent episode of dizziness occurred during activity  -Patient states he has inadequate water intake. Encouraged to drink approximately 2 liters daily  -Encouraged to reach out to heart and valve if symptoms continue or worsen    2.  Primary hypertension  -Blood pressure currently well controlled   -Patient does not check blood pressure at home    3. ICD (implantable cardioverter-defibrillator) in place  -S/p AICD placement in [place]. Last documented shock delivered on 10/27  -Patient would like to establish care with [facility]  -We will refer patient to electrophysiology for further management  -Did encourage patient to reach out to current cardiology practice for recent interrogation of patient's device  - Ambulatory Referral to Cardiac Electrophysiology    4. Cardiac arrest with ventricular fibrillation  -Patient previously followed with cardiology in [place]  -Most recent shock delivered by AICD on 10/27  -Echocardiogram 6/26/2023:    Left ventricular ejection fraction appears to be 61 - 65%.    Left ventricular diastolic function was normal.    Estimated right ventricular systolic pressure from tricuspid regurgitation is normal (<35 mmHg).  -Stress test 6/26/2023:    Blood pressure demonstrated a hypertensive response to stress.    No ECG evidence of myocardial ischemia.    Negative clinical evidence of myocardial ischemia.    Findings consistent with a normal ECG stress test.    No ECG evidence of myocardial ischemia. Negative clinical evidence of myocardial ischemia. Findings consistent with a normal ECG stress test.  - Ambulatory Referral to Cardiac Electrophysiology  -Patient may reach out to heart and valve center as needed for ongoing or worsening symptoms.       Follow Up {Instructions Charge Capture  Follow-up Communications :23}     Return if symptoms worsen or fail to improve.      Patient was given instructions and counseling regarding his condition or for health maintenance advice. Please see specific information pulled into the AVS if appropriate.  Patient was instructed to call the Heart and Valve Center with any questions, concerns, or worsening symptoms.    Dictated Utilizing Dragon Dictation   Please  note that portions of this note were completed with a voice recognition program.   Part of this note may be an electronic transcription/translation of spoken language to printed text using the Dragon Dictation System.

## 2023-11-10 LAB
QT INTERVAL: 396 MS
QT INTERVAL: 418 MS
QTC INTERVAL: 392 MS
QTC INTERVAL: 398 MS

## 2023-11-27 RX ORDER — METOPROLOL SUCCINATE 50 MG/1
50 TABLET, EXTENDED RELEASE ORAL DAILY
Qty: 30 TABLET | Refills: 0 | OUTPATIENT
Start: 2023-11-27

## 2023-11-28 RX ORDER — METOPROLOL SUCCINATE 50 MG/1
50 TABLET, EXTENDED RELEASE ORAL DAILY
Qty: 90 TABLET | Refills: 3 | Status: SHIPPED | OUTPATIENT
Start: 2023-11-28

## 2023-12-07 ENCOUNTER — OFFICE VISIT (OUTPATIENT)
Dept: CARDIOLOGY | Facility: CLINIC | Age: 43
End: 2023-12-07
Payer: COMMERCIAL

## 2023-12-07 VITALS
BODY MASS INDEX: 42 KG/M2 | HEART RATE: 64 BPM | DIASTOLIC BLOOD PRESSURE: 90 MMHG | HEIGHT: 71 IN | SYSTOLIC BLOOD PRESSURE: 132 MMHG | OXYGEN SATURATION: 96 % | WEIGHT: 300 LBS

## 2023-12-07 DIAGNOSIS — I10 PRIMARY HYPERTENSION: Chronic | ICD-10-CM

## 2023-12-07 DIAGNOSIS — I47.20 VT (VENTRICULAR TACHYCARDIA): Primary | ICD-10-CM

## 2023-12-07 DIAGNOSIS — Z95.810 ICD (IMPLANTABLE CARDIOVERTER-DEFIBRILLATOR) IN PLACE: Chronic | ICD-10-CM

## 2023-12-07 RX ORDER — FLECAINIDE ACETATE 100 MG/1
100 TABLET ORAL 2 TIMES DAILY
Qty: 60 TABLET | Refills: 3 | Status: SHIPPED | OUTPATIENT
Start: 2023-12-07

## 2023-12-07 NOTE — ASSESSMENT & PLAN NOTE
Idiopathic ventricular tachycardia triggered by PVC  -On age and sudden cardiac death will need genetic testing; referral will be made  -Discussed EP study versus medication up titration, patient elects to uptight the medication for now  -Starting flecainide 100 mg twice a day followed by exercise stress testing 1 week after to look for QRS widening

## 2023-12-07 NOTE — PROGRESS NOTES
Electrophysiology Clinic Consult     Raymond Pascual  [id number]  1980    Referring Provider: Elsy Loomis APRN   PCP: Kristian Johnson MD  [address] [place] [address]  / [facility] [id number]    Date of Service: 12/07/23    Chief Complaint   Patient presents with    Cardiac arrest with ventricular fibrillation     Problem List    Idiopathic VT/Cardiac arrest/VF  6/2021 [facility] ER: playing basketball, became symptomatic, EMS found in VT at 240bpm, cardioverted at 50J which decompensated to VF and was defibrillated at 200J/given amio bolus 150mg  Echocardiogram 6/19/21: EF 71%, trace MR/TR, borderline concentric hypertrophy  LHC 6/19/21: normal cors  Cardiac MRI 6/22/21: EF 58%, normal RV size and function, no delayed enhancement.  EPS +  VVI ICD Implant ([facility]) 6/22/2021: Monomorphic VT from the RV near the region of the HIS, CL 220ms, spontaneously converted. Dr. Dumont  ICD shock 5/8/2023 while playing basketball  Echocardiogram 6/26/23  EF 61-65%, trace TR  Treadmill stress test 6/26/23: negative, normal ECG stress test  Recent shock 10/27 - [facility] ED  HTN  Surgical history  Appendectomy  Carpel tunnel release      History of Present Illness  Raymond Pascual is a 43 y.o. male who presents to my electrophysiology clinic for evaluation of VT/VF.  Patient has a history of sudden cardiac death which happened in 2021 in setting of exercise while he was playing basketball.  EMS reports finding the patient in VT which had to defibrillated.  Status post EP study with Dr. Dumont-found to have monomorphic VT origin from Rv, near the His region. S/p single chamber ICD.  Patient having recurrent ICD shocks during exercise specifically when he is playing basketball.  Vice interrogation today showing VT episodes which seem to have triggered with PVCs.  Terminated with internal ICD defibrillation.  Here for further evaluation and treatment.     Review of Systems   Constitutional:  Negative for activity change, fatigue and fever.  "  Respiratory:  Negative for chest tightness and shortness of breath.    Cardiovascular:  Positive for palpitations. Negative for chest pain and leg swelling.   Gastrointestinal:  Negative for constipation and diarrhea.   Genitourinary:  Negative for decreased urine volume and difficulty urinating.   Skin:  Negative for wound.   Neurological:  Negative for dizziness, syncope, weakness and light-headedness.   Psychiatric/Behavioral:  Negative for suicidal ideas.        Outpatient Medications Marked as Taking for the 12/7/23 encounter (Office Visit) with Boy Dubon MD   Medication Sig Dispense Refill    metoprolol succinate XL (TOPROL-XL) 50 MG 24 hr tablet Take 1 tablet by mouth once daily 90 tablet 3       Physical Exam  Vitals:    12/07/23 0855   BP: 132/90   BP Location: Left arm   Patient Position: Sitting   Pulse: 64   SpO2: 96%   Weight: 136 kg (300 lb)   Height: 180.3 cm (71\")     GENERAL: Well-developed, well-nourished patient in no acute distress.  HEENT: NC, AC, PERRLA. MMM  NECK: No JVD. No carotid bruits auscultated.  LUNGS: Clear to auscultation bilaterally.  CARDIOVASCULAR: RRR No murmurs, gallops or rubs noted.   ABDOMEN: Soft, nontender. Positive bowel sounds.  MUSCULOSKELETAL: No gross deformities. No clubbing, cyanosis  EXT: pulses intact, No edema  SKIN: Pink, warm  Neuro: Nonfocal exam. Gait intact    Diagnostic Data    ECG 12 Lead    Date/Time: 12/7/2023 12:21 PM  Performed by: Boy Dubon MD    Authorized by: Boy Dubon MD  Comparison: not compared with previous ECG   Rhythm: sinus bradycardia  Rate: normal  Conduction: conduction normal  QRS axis: normal  Other: no other findings    Clinical impression: normal ECG          Lab Results   Component Value Date    GLUCOSE 102 (H) 11/07/2023    CALCIUM 8.9 11/07/2023     11/07/2023    K 4.5 11/07/2023    CO2 30.0 (H) 11/07/2023     11/07/2023    BUN 17 11/07/2023    CREATININE 1.31 (H) 11/07/2023    EGFRIFAFRI 83 06/20/2021    BCR " "13.0 11/07/2023    ANIONGAP 7.0 11/07/2023     Lab Results   Component Value Date    WBC 7.08 11/07/2023    HGB 15.5 11/07/2023    HCT 47.9 11/07/2023    MCV 82.4 11/07/2023     11/07/2023     Lab Results   Component Value Date    INR 1.11 (H) 06/19/2021    PROTIME 14.1 06/19/2021     No results found for: \"TSH\", \"W0YADRM\", \"C0KNWVR\", \"THYROIDAB\"    Cardiac Testing:      I personally viewed and interpreted the patient's EKG/Telemetry/lab data      Assessment and Plan   Diagnoses and all orders for this visit:    1. VT (ventricular tachycardia) (Primary)  Assessment & Plan:  Idiopathic ventricular tachycardia triggered by PVC  -On age and sudden cardiac death will need genetic testing; referral will be made  -Discussed EP study versus medication up titration, patient elects to uptight the medication for now  -Starting flecainide 100 mg twice a day followed by exercise stress testing 1 week after to look for QRS widening    Orders:  -     Ambulatory Referral to Genetic Counseling/Testing  -     Treadmill Stress Test; Future    2. ICD (implantable cardioverter-defibrillator) in place  Assessment & Plan:  Single-chamber ICD implanted by Dr. Dumont  -Device check today:   RV pacing less than 1%  RV amplitude to 22.3  RV threshold point 9.4  Lead impedance 438  High-voltage impedance 79  Battery life expectancy about 14 years      3. Primary hypertension  Assessment & Plan:  Borderline blood pressure  Continue on metoprolol  Follow-up with PCP        Other orders  -     flecainide (TAMBOCOR) 100 MG tablet; Take 1 tablet by mouth 2 (Two) Times a Day. Start one week prior to exam  Dispense: 60 tablet; Refill: 3      Body mass index is 41.84 kg/m².    ACP discussion was declined by the patient. Patient does not have an advance directive, declines further assistance.    Follow Up  Return in about 6 months (around 6/7/2024).    Thank you for allowing me to participate in the care of your patient. Please to not " hesitate to contact me with additional questions or concerns.        Boy Dubon MD  Cardiac Electrophysiologist  [facility] / [facility]

## 2023-12-07 NOTE — ASSESSMENT & PLAN NOTE
Single-chamber ICD implanted by Dr. Dumont  -Device check today:   RV pacing less than 1%  RV amplitude to 22.3  RV threshold point 9.4  Lead impedance 438  High-voltage impedance 79  Battery life expectancy about 14 years

## 2023-12-14 ENCOUNTER — TELEPHONE (OUTPATIENT)
Dept: CARDIOLOGY | Facility: CLINIC | Age: 43
End: 2023-12-14
Payer: COMMERCIAL

## 2023-12-14 RX ORDER — PROPAFENONE HYDROCHLORIDE 150 MG/1
150 TABLET, COATED ORAL EVERY 8 HOURS
Qty: 90 TABLET | Refills: 6 | Status: SHIPPED | OUTPATIENT
Start: 2023-12-14

## 2023-12-14 NOTE — TELEPHONE ENCOUNTER
Patient is currently taking Flecainide 100 mg BID.     Last night the left corner of his bottom lip started swelling on the inside and on the outside. His ears have been ringing persistently as well. He has not been checking his BP and HR regularly. He denies issues swallowing, having shortness of breath or chest pain.    He would like to know if you want to change him to a different medication before his upcoming stress test?

## 2023-12-14 NOTE — TELEPHONE ENCOUNTER
Patient is agreeable to change to Propafenone IR 150mg po TID.    RX sent to [facility] pharmacy.

## 2023-12-18 ENCOUNTER — HOSPITAL ENCOUNTER (OUTPATIENT)
Dept: CARDIOLOGY | Facility: HOSPITAL | Age: 43
Discharge: HOME | End: 2023-12-18
Admitting: INTERNAL MEDICINE
Payer: COMMERCIAL

## 2023-12-18 VITALS
SYSTOLIC BLOOD PRESSURE: 121 MMHG | HEIGHT: 71 IN | WEIGHT: 299.83 LBS | OXYGEN SATURATION: 100 % | BODY MASS INDEX: 41.98 KG/M2 | HEART RATE: 75 BPM | DIASTOLIC BLOOD PRESSURE: 89 MMHG

## 2023-12-18 DIAGNOSIS — I47.20 VT (VENTRICULAR TACHYCARDIA): ICD-10-CM

## 2023-12-18 LAB
BH CV STRESS BP STAGE 1: NORMAL
BH CV STRESS BP STAGE 2: NORMAL
BH CV STRESS BP STAGE 3: NORMAL
BH CV STRESS DURATION MIN STAGE 1: 3
BH CV STRESS DURATION MIN STAGE 2: 3
BH CV STRESS DURATION MIN STAGE 3: 2
BH CV STRESS DURATION SEC STAGE 1: 0
BH CV STRESS DURATION SEC STAGE 2: 0
BH CV STRESS DURATION SEC STAGE 3: 50
BH CV STRESS GRADE STAGE 1: 10
BH CV STRESS GRADE STAGE 2: 12
BH CV STRESS GRADE STAGE 3: 14
BH CV STRESS HR STAGE 1: 123
BH CV STRESS HR STAGE 2: 162
BH CV STRESS HR STAGE 3: 176
BH CV STRESS METS STAGE 1: 5
BH CV STRESS METS STAGE 2: 7.5
BH CV STRESS METS STAGE 3: 10
BH CV STRESS O2 STAGE 1: 98
BH CV STRESS O2 STAGE 2: 94
BH CV STRESS O2 STAGE 3: 93
BH CV STRESS PROTOCOL 1: NORMAL
BH CV STRESS RECOVERY BP: NORMAL MMHG
BH CV STRESS RECOVERY HR: 95 BPM
BH CV STRESS RECOVERY O2: 99 %
BH CV STRESS SPEED STAGE 1: 1.7
BH CV STRESS SPEED STAGE 2: 2.5
BH CV STRESS SPEED STAGE 3: 3.4
BH CV STRESS STAGE 1: 1
BH CV STRESS STAGE 2: 2
BH CV STRESS STAGE 3: 3
MAXIMAL PREDICTED HEART RATE: 178 BPM
PERCENT MAX PREDICTED HR: 100.56 %
STRESS BASELINE BP: NORMAL MMHG
STRESS BASELINE HR: 66 BPM
STRESS O2 SAT REST: 100 %
STRESS PERCENT HR: 118 %
STRESS POST ESTIMATED WORKLOAD: 10.1 METS
STRESS POST EXERCISE DUR MIN: 8 MIN
STRESS POST EXERCISE DUR SEC: 50 SEC
STRESS POST O2 SAT PEAK: 93 %
STRESS POST PEAK BP: NORMAL MMHG
STRESS POST PEAK HR: 179 BPM
STRESS TARGET HR: 151 BPM

## 2023-12-18 PROCEDURE — 93017 CV STRESS TEST TRACING ONLY: CPT

## 2024-01-03 ENCOUNTER — TELEPHONE (OUTPATIENT)
Dept: CARDIOLOGY | Facility: CLINIC | Age: 44
End: 2024-01-03
Payer: COMMERCIAL

## 2024-01-03 NOTE — TELEPHONE ENCOUNTER
Remote monitor reading received showing elevated heart logic.  Called to do a symptoms check.  Left message for a return call.

## 2024-02-16 ENCOUNTER — OFFICE VISIT (OUTPATIENT)
Age: 44
End: 2024-02-16
Payer: COMMERCIAL

## 2024-02-16 ENCOUNTER — CLINICAL SUPPORT NO REQUIREMENTS (OUTPATIENT)
Age: 44
End: 2024-02-16
Payer: COMMERCIAL

## 2024-02-16 VITALS
BODY MASS INDEX: 42.42 KG/M2 | HEIGHT: 71 IN | SYSTOLIC BLOOD PRESSURE: 128 MMHG | WEIGHT: 303 LBS | DIASTOLIC BLOOD PRESSURE: 90 MMHG | HEART RATE: 55 BPM

## 2024-02-16 DIAGNOSIS — I47.20 VT (VENTRICULAR TACHYCARDIA): ICD-10-CM

## 2024-02-16 DIAGNOSIS — Z95.810 ICD (IMPLANTABLE CARDIOVERTER-DEFIBRILLATOR) IN PLACE: ICD-10-CM

## 2024-02-16 DIAGNOSIS — I47.20 VT (VENTRICULAR TACHYCARDIA): Primary | ICD-10-CM

## 2024-02-16 DIAGNOSIS — I46.9 CARDIAC ARREST WITH VENTRICULAR FIBRILLATION: Primary | ICD-10-CM

## 2024-02-16 DIAGNOSIS — I49.01 CARDIAC ARREST WITH VENTRICULAR FIBRILLATION: Primary | ICD-10-CM

## 2024-02-16 DIAGNOSIS — I10 PRIMARY HYPERTENSION: ICD-10-CM

## 2024-03-12 ENCOUNTER — CLINICAL SUPPORT (OUTPATIENT)
Dept: GENETICS | Facility: HOSPITAL | Age: 44
End: 2024-03-12
Payer: COMMERCIAL

## 2024-03-12 ENCOUNTER — LAB (OUTPATIENT)
Dept: LAB | Facility: HOSPITAL | Age: 44
End: 2024-03-12
Payer: COMMERCIAL

## 2024-03-12 DIAGNOSIS — Z13.79 GENETIC TESTING: Primary | ICD-10-CM

## 2024-03-12 DIAGNOSIS — I49.01 VENTRICULAR FIBRILLATION: ICD-10-CM

## 2024-03-12 DIAGNOSIS — I47.20 VT (VENTRICULAR TACHYCARDIA): ICD-10-CM

## 2024-03-12 DIAGNOSIS — R55 SYNCOPE, UNSPECIFIED SYNCOPE TYPE: ICD-10-CM

## 2024-03-12 PROCEDURE — 96040: CPT

## 2024-03-12 NOTE — PROGRESS NOTES
Date of Visit: 3/12/2024   Patient Name: Raymond Pascual  : 1980   MRN: [id number]     Referring Provider: Boy Dubon MD     REASON FOR CONSULT  Raymond Pascual is a 43 y.o. male referred for genetic counseling due his personal history of a sudden cardiac event and arrhythmia.  Mr. Pascual was interested in discussing his risk for a hereditary cardiac disease and susceptibility and genetic testing.  Mr. Pascual had a sudden cardiac event in 2021 while playing basketball.  He started to feel severe chest pains and near syncope while playing.  He was then taken to the ER where he was diagnosed with idiopathic ventricular tachycardia and ventricular fibrillation.  An implantable cardioverter defibrillator was implanted shortly after.  Since he described having being shocked by the ICD on 2 different instances, both while playing basketball, but has had normal stress tests and otherwise is feeling healthy.  Mr. Pascual reported that his provider in [place] mentioned a risk for an arrhythmogenic right ventricular cardiomyopathy (ARVC) developing.  Mr. Pascual elected to pursue genetic testing via  [employer]'s Comprehensive Arrhythmia and Cardiomyopathy panel through [employer]'s [facility] sponsored testing program analyzing 157 arhythmia and cardiomyopathy risk genes.      PERTINENT FAMILY HISTORY:  A four-generation pedigree was obtained via patient reporting    Father - sudden cardiac death, 38  Paternal 2nd Cousin - atrial fibrillation, 40's    GENETIC COUNSELING  Mr. Pascual's diagnosis and reported family history is suggestive of a hereditary cardiovascular risk.  The majority of sudden cardiac events are the result of arrhythmia secondary to structural heart disease. However, unexplained cardiac arrest in the young (<35y) or sudden sudden cardiac events (<50y) are a less frequent occurrence and more commonly suggest an inherited form of heart disease. The contribution of inherited forms of arrhythmia to the  incidence of unexplained sudden cardiac arrest/event is not well defined.  Hereditary arrhythmias/channelopathies/cardiomyopathies are most frequently inherited in an autosomal dominant manner, meaning a single non-working copy of the gene is enough to cause the condition. The gene may be passed from either the mother or father to either sons or daughters. Children of an individual who carries a mutation have a 50% chance of inheriting the disease-causing gene.  Hereditary arrhythmias and cardiomyopathies often display incomplete penetrance with variable expressivity, meaning that not all individuals who inherit the disease causing gene in a family will display symptoms of the condition and that the symptoms can vary within a family.    GENETIC TESTING  The risks, benefits, and limitations of genetic testing and implications for clinical management following testing were reviewed.  The implications of a positive, negative, or VUS test result were discussed.  Genetic test results can influence decisions regarding screening and prevention.  Genetic testing can have significant psychological implications for both individuals and families. Also discussed was the possibility of insurance discrimination based on genetic test results and the laws in place to prevent this, as well as the limitations of these laws.       The Genetic Information Nondiscrimination Act ([place] is a federal law enacted in May 2008.  [place] prohibits discrimination on the basis of genetic information such as genetic test results with respect to health insurance and employment status.  Under Title 1 of [place], health insurance companies are prohibited from using an individual's genetic information to change premiums, drop or change an individual's coverage, or prevent coverage from being acquired.  Title 2 of [place] prohibits employers from using genetic information in employment decisions such as, but not limited to, hiring, firing, promotions, pay, and  "job assignments.  [place] protections do not protect against genetic discrimination by life insurance, long-term care or disability insurance,  service members, federal employees, those using the [employer], or those employed by a small business with fewer than 15 employees.     We discussed panel testing, which could involve testing numerous genes associated with increased cardiac risk.  With multigene panel testing, it is not uncommon for a variant of uncertain significance (VUS) to be identified.  Variants are termed \"VUS\" when there is not sufficient evidence to classify the variant as a negative (not harmful) variant or a positive (harmful) mutation. Genetic testing labs work to reclassify all VUSs overtime and will issue an updated report when a reclassification occurs.  The majority (over 90%) of VUSs that are reclassified are found to be negative genetic variants, however a small percentage will be upgraded to a harmful mutation. Clinically, a VUS is treated as a negative result.  If genetic testing is negative or a variant of uncertain significance (VUS) is found, management should be guided by a family history-based risk assessment.  Medical care should not be altered based on a VUS result.  Genetic testing for other unaffected relatives is not recommended for a VUS.     We discussed that there are limitations to a negative genetic test result.  If Mr. Pascual tests negative it could be for several different reasons such as:     Mr. Pascual could have a pathogenic variant in one of the genes tested for that was not detected. Current testing will identify the overwhelming majority of pathogenic variants, but some are not detectable with current technology.   Mr. Pascual may carry a pathogenic variant in another gene associated with hereditary cardiac disease that was not tested for, or the risk in the family may be due to other genetic factors that are not well understood.    ASSESSMENT AND " PLAN  We reviewed the options for genetic testing including a multi-gene panel of high risk genes, a panel of genes with known management guidelines, or a broader approach including more newly discovered genes.  We reviewed the benefits and drawbacks of this approach, including finding mutations in genes that are less well understood, or results that are unexpected based on this family history.  Mr. Pascual elected to pursue genetic testing.  We reviewed the options for genetic testing including a multi-gene panel of high risk genes, a panel of genes with known management guidelines, or a broader approach including more newly discovered genes. We reviewed the benefits and drawbacks of this approach, including finding mutations in genes that are less well understood, or results that are unexpected based on this family history.  [employer]'s Arrhythmia and Cardiomyopathy Comprehensive Panel sponsored through the [facility] program was ordered, which analyzes 157 genes associated with an increased risk for arrhythmia and cardiomyopathy. The genes on this panel include ABCC9, ACADVL, ACTC1, ACTN2, AGL, ALMS1, ALPK3, BAG3, BRAF, ZHKNM2M, AKPLS0N, CALM1, CALM2, CALM3, CASQ2, CBL, CDH2, CPT2, CRYAB, CSRP3, ELOISE, DMD, DNAJC19, DOLK, DSC2, DSG2, DSP, ELAC2, EMD, EYA4, FHL1, FKRP, FKTN, FLNC, GAA, GATA4, GATA5, GJA5, GLA, HCN4, HRAS, JUP, KCNE1, KCNH2, KCNJ2, KCNQ1, KRAS, LAMP2, LMNA, LZTR1, MAP2K1, MAP2K2, MRAS, MTO1, MYBPC3, MYH7, MYL2, MYL3, MYL4, MYLK3, NF1, NKX2-5, NRAS, PCCA, PCCB, PKP2, PLN, PPA2, PPCS, PPP1CB, PRKAG2, PTPN11, RAF1, RASA1, RBM20, RIT1, RYR2, SCN5A, SDHA, SGCD, SHOC2, JWN53B1, SOS1, SOS2, SPRED1, DURAN, TBX20, TCAP, TMEM43, TMEM70, TNNC1, TNNI3, TNNI3K, TNNT2, TPM1, TRDN, TRPM4, TTN, TTR, VCL, A2ML1, AKAP9, ANK2, ANKRD1, UNEZH7E3, CACNB2, CALR3, CAV3, CHRM2, CTF1, CTNNA3, DTNA, FHL2, GATA6, GATAD1, GPD1L, HAND1, ILK, JPH2, KCNA5, KCND3, KCNE2, KCNE3, KCNE5, KCNJ5, KCNJ8, KCNK3, KIF20A, KLF10, LAMA4, LDB3, LRRC10,  MAP3K8, MED12, MYH6, MYLK2, MYOM1, MYOZ2, MYPN, NEBL, NEXN, NPPA, PDLIM3, PLEKHM2, PRDM16, RANGRF, RASA2, RRAS, SCN10A, SCN1B, SCN2B, SCN3B, SCN4B, SLMAP, SNTA1, TMPO, and TXNRD2.   A blood sample for genetic evaluation was collected on 3/12/2024.  Genetic testing results are expected in 2 - 4 weeks from 3/12/2024.  We will contact the patient when results are received.  Mr. Pascual asked excellent questions during the consult and did not demonstrate any acute psychosocial distress during our visit. This clinic encounter was 45 minutes.      Isaak Khan MS  Genetic Counselor  [facility]

## 2024-03-25 ENCOUNTER — DOCUMENTATION (OUTPATIENT)
Dept: GENETICS | Facility: HOSPITAL | Age: 44
End: 2024-03-25
Payer: COMMERCIAL

## 2024-03-25 NOTE — PROGRESS NOTES
Date of Visit: 3/22/2024  Name: Raymond Pascual  : 1980  MRN: [id number]    Referring Provider: Boy Dubon MD        REASON FOR CONSULT  Raymond Pascual is a 43 y.o. male referred for genetic counseling due his personal history of a sudden cardiac event and arrhythmia.  Mr. Pascual was interested in discussing his risk for a hereditary cardiac disease and susceptibility and genetic testing.  Mr. Pascual had a sudden cardiac event in 2021 while playing basketball.  He started to feel severe chest pains and near syncope while playing.  He was then taken to the ER where he was diagnosed with idiopathic ventricular tachycardia and ventricular fibrillation.  An implantable cardioverter defibrillator was implanted shortly after.  Since its implantation, he reports being shocked by the ICD on 2 different instances, both while playing basketball, but has had normal stress tests and otherwise is feeling healthy.  Mr. Pascual elected to pursue genetic testing via  [employer]'s Comprehensive Arrhythmia and Cardiomyopathy panel through [employer]'s [employer] sponsored testing program analyzing 157 arhythmia and cardiomyopathy risk genes.   Mr. 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  This genetic test result does not explain his cardiac conditions or increase his risk.  Mr. Pascual was notified of these results via telephone on 3/22/2024 .     PERTINENT FAMILY HISTORY:  A four-generation pedigree was obtained via patient reporting     Father - sudden cardiac death, 38  Paternal 2nd Cousin - atrial fibrillation, 40's    GENETIC TESTING RESULTS AND RECOMMENDATIONS  Genetic testing was performed by [facility] analyzing 157-genes associated with hereditary arrhythmia and cardiomyopathy.    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  Germline pathogenic FKRP mutations are associated with autosomal recessive muscular dystrophy-dystroglycanopathies.  Individuals must possess pathogenic mutations in both copies of the FKRP gene to be affected.  FKRP-related muscular dystrophy is clinically heterogenous as it is associated with limb-girdle dystrophy (LGMD) R9 (also known as LGMD 2I or type C), Walker-Warburg syndrome, and others.  Limb-girdle dystrophy R9 is characterized by proximal muscle weakness, calf hypertrophy, hypotonia, elevated CK level, normal cognition, and no structural brain abnormalities.  The associated cardiac disease associated with this condition is dilated cardiomyopathy.  Those affected with this form of LGMD are expected to have normal lifespans.  Walker-Warburg is the most severe presentation of these recessive conditions characterized by congenital hypotonia, progressive muscle weakness and atrophy, ocular and brain malformations such as cobblestone lissencephaly and hydrocephalus, severe motor developmental delay, and profound intellectual disability.  Most individuals effected by Walker-Warburg syndrome do not survive past infancy or childhood.  If an individual is found to only possess a pathogenic mutation in a single copy of the FKRP gene, then that individual is designated as a carrier for the condition.  Carriers for FKRP-related muscular dystrophies are not at risk for the condition.  The carrier rate for FKRP mutations is 1 in every 158 people.  A carrier for FKRP-related muscular dystrophy has a 50% chance of passing on the FKRP mutation to their children, and up to a 25% chance of having a pregnancy or child affected by the conditions if their partner is also a carrier of a FKRP mutation.    A negative result does not clarify the cause of or absolutely rule out a hereditary cause for Mr. Pascual's personal or family  history of cardiac disease.  There could be a mutation in the family that explains the pattern of cardiac disease that we cannot identify at this time or in a gene that was not tested.  There could be a mutation in one of the genes tested that was not detected. Current genetic testing technologies will identify the majority of mutations with high accuracy, but some remain undetectable at this time.  Mr. Pascual personal and family history of cardiac disease could be occurring not due to a single gene mutation, but due to multiple undetectable genetic and environmental factors.  Increased cardiac risks for Mr. Pascual and his family may remain due to his diagnosis and the family history of cardiac disease.  We encourage patients to reach out over time to inquire about any new methodologies of testing or newly identified cardiac risk genes that could explain their personal or family history of cardiac disease.    GENETIC COUNSELING  The majority of sudden cardiac events are the result of arrhythmia secondary to structural heart disease. However, unexplained cardiac arrest in the young (<35y) or sudden sudden cardiac events (<50y) are a less frequent occurrence and more commonly suggest an inherited form of heart disease. The contribution of inherited forms of arrhythmia to the incidence of unexplained sudden cardiac arrest/event is not well defined.  Hereditary arrhythmias/channelopathies/cardiomyopathies are most frequently inherited in an autosomal dominant manner, meaning a single non-working copy of the gene is enough to cause the condition. The gene may be passed from either the mother or father to either sons or daughters. Children of an individual who carries a mutation have a 50% chance of inheriting the disease-causing gene.  Hereditary arrhythmias and cardiomyopathies often display incomplete penetrance with variable expressivity, meaning that not all individuals who inherit the disease causing gene in a  "family will display symptoms of the condition and that the symptoms can vary within a family.     GENETIC TESTING  The risks, benefits, and limitations of genetic testing and implications for clinical management following testing were reviewed.  The implications of a positive, negative, or VUS test result were discussed.  Genetic test results can influence decisions regarding screening and prevention.  Genetic testing can have significant psychological implications for both individuals and families. Also discussed was the possibility of insurance discrimination based on genetic test results and the laws in place to prevent this, as well as the limitations of these laws.       The Genetic Information Nondiscrimination Act ([place]) is a federal law enacted in May 2008.  [place] prohibits discrimination on the basis of genetic information such as genetic test results with respect to health insurance and employment status.  Under Title [place] of [place], health insurance companies are prohibited from using an individual's genetic information to change premiums, drop or change an individual's coverage, or prevent coverage from being acquired.  Title 2 of [place] prohibits employers from using genetic information in employment decisions such as, but not limited to, hiring, firing, promotions, pay, and job assignments.  [place] protections do not protect against genetic discrimination by life insurance, long-term care or disability insurance,  service members, federal employees, those using the [employer], or those employed by a small business with fewer than 15 employees.     We discussed panel testing, which could involve testing numerous genes associated with increased cardiac risk.  With multigene panel testing, it is not uncommon for a variant of uncertain significance (VUS) to be identified.  Variants are termed \"VUS\" when there is not sufficient evidence to classify the variant as a negative (not harmful) variant or " a positive (harmful) mutation. Genetic testing labs work to reclassify all VUSs overtime and will issue an updated report when a reclassification occurs.  The majority (over 90%) of VUSs that are reclassified are found to be negative genetic variants, however a small percentage will be upgraded to a harmful mutation. Clinically, a VUS is treated as a negative result.  If genetic testing is negative or a variant of uncertain significance (VUS) is found, management should be guided by a family history-based risk assessment.  Medical care should not be altered based on a VUS result.  Genetic testing for other unaffected relatives is not recommended for a VUS.     FAMILY CONSIDERATIONS  Genetic testing for Mr. Pascual's relatives may be informative if they are known to have manifestations similar to those associated with recessive FKRP-related muscular dystrophy or are in the stages of family planning.  If any of his relatives wish to know their carrier status themselves then genetic counseling and testing may be appropriate to determine the risk of a pregnancy or child to be affected by recessive FKRP-related muscular dystrophy.  The known carrier rate for pathogenic FKRP mutations is 1 in every 158 people or 0.63%.  All carriers of any recessive condition have up to 25% chance of having a child affected by the condition if their partner is also a carrier for the same condition.     Family members are encouraged to discuss their family history of cardiac disease and appropriate cardiac monitoring recommendations with their healthcare providers.  Family members are also encouraged to inquire about information regarding genetics and genetic testing, if appropriate, at a clinic that offers genetic counseling or their healthcare provider.  We would be happy to see relatives in our clinic for genetic counseling and testing.  They can request a referral from their healthcare provider to [facility] and  that will prompt a coordinator to call them for an appointment.   For family members who live elsewhere, there are genetic counselors at most major [facility] centers.  They can find a genetic counselor by visiting the National Society of Genetic Counselors website at www.nsgc.org or they can call our office and we would be happy to give them the contact information of the closest genetic counselor.      SUMMARY  Mr. Pascual's hereditary arrhythmia and cardiomyopathy genetic test identified no pathogenic mutations explaining why his tachycardia or other cardiac manifestations may have developed, nor does it explain his family history of cardiac disease.  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  He is a carrier for these conditions, but not at risk for these conditions.  He has a 50% chance of passing this mutation on to any future children and up to a 25% chance of having a child affected by these conditions if his partner is also a carrier of a pathogenic FKRP mutation.  It is recommended that Mr. Pascual follow his provider's recommendations for future cardiac monitoring and future risk reduction.  A copy of the genetic test report is attached to this note.   Mr. Pascual asked excellent questions during the consult and did not demonstrate any acute psychosocial distress during our visit. This clinic encounter was 20 minutes in duration.      Isaak Khan MS  Genetic Counselor  [facility]    Cc:  Boy Quintero MD Mandrola, John, MD

## 2024-06-05 NOTE — PROGRESS NOTES
Raymond Pascual  [id number]  1980  [phone]      06/06/2024      [facility] CARDIOLOGY     Referring Provider: No ref. provider found     Kristian Johnson MD  [address] [place] [address] [address] [place] [address]    Chief Complaint   Patient presents with    VT (ventricular tachycardia)     6-Mo F/U       Problem List  Idiopathic VT/Cardiac arrest/VF  6/2021 [facility] ER: playing basketball, became symptomatic, EMS found in VT at 240bpm, cardioverted at 50J which decompensated to VF and was defibrillated at 200J/given amio bolus 150mg  Echocardiogram 6/19/21: EF 71%, trace MR/TR, borderline concentric hypertrophy  LHC 6/19/21: normal cors  Cardiac MRI 6/22/21: EF 58%, normal RV size and function, no delayed enhancement.  EPS +  VVI ICD Implant ([facility]) 6/22/2021: Monomorphic VT from the RV near the region of the HIS, CL 220ms, spontaneously converted. Dr. Dumont  ICD shock 5/8/2023 while playing basketball  Echocardiogram 6/26/23  EF 61-65%, trace TR  Treadmill stress test 6/26/23: negative, normal ECG stress test  [facility]  [facility]N  Surgical history  Appendectomy  Carpel tunnel release      History of Present Illness   Raymond Pascual is a 44 y.o. male who presents to my electrophysiology clinic for follow up of idiopathic VT and VF. Since starting the propafenone, he states he is feeling better and have less palpitations. He is taking it BID instead of TID because it he has less side effects. He has been slowly starting to workout again, but has some anxiety going to the gym due to previous ICD shocks. Denies chest pain, shortness of breath, palpitations, LH, and syncope. He is having a sleep study done tomorrow.      Outpatient Medications Marked as Taking for the 6/6/24 encounter (Office Visit) with Boy Dubon MD   Medication Sig Dispense Refill    metoprolol succinate XL (TOPROL-XL) 50 MG 24 hr tablet Take 1 tablet by mouth once daily 90 tablet 3    propafenone (RYTHMOL) 150  "MG tablet Take 1 tablet by mouth Every 12 (Twelve) Hours.      [DISCONTINUED] propafenone (RYTHMOL) 150 MG tablet Take 1 tablet by mouth Every 8 (Eight) Hours. 90 tablet 6            Physical Exam  Vitals:    06/06/24 1121   BP: 130/80   BP Location: Left arm   Patient Position: Sitting   Cuff Size: Large Adult   Pulse: 61   SpO2: 97%   Weight: (!) 139 kg (307 lb 6.4 oz)   Height: 180.3 cm (71\")     GENERAL: Well-developed, well-nourished patient in no acute distress.  HEENT: NC, AC, PERRLA. MMM  NECK: No JVD. No carotid bruits auscultated.  LUNGS: Clear to auscultation bilaterally.  CARDIOVASCULAR: RRR No murmurs, gallops or rubs noted.   ABDOMEN: Soft, nontender. Positive bowel sounds.  MUSCULOSKELETAL: No gross deformities. No clubbing, cyanosis  EXT: pulses intact, No edema  SKIN: Pink, warm  Neuro: Nonfocal exam. Gait intact    Diagnostic Data    ECG 12 Lead    Date/Time: 6/6/2024 11:56 AM  Performed by: Violet Baer APRN    Authorized by: Violet Baer APRN  Comparison: compared with previous ECG from 2/16/2024  Similar to previous ECG  Rhythm: sinus rhythm  Rate: normal  BPM: 61  QRS axis: normal    Clinical impression: normal ECG          Lab Results   Component Value Date    GLUCOSE 102 (H) 11/07/2023    CALCIUM 8.9 11/07/2023     11/07/2023    K 4.5 11/07/2023    CO2 30.0 (H) 11/07/2023     11/07/2023    BUN 17 11/07/2023    CREATININE 1.31 (H) 11/07/2023    EGFRIFAFRI 83 06/20/2021    BCR 13.0 11/07/2023    ANIONGAP 7.0 11/07/2023     Lab Results   Component Value Date    WBC 7.08 11/07/2023    HGB 15.5 11/07/2023    HCT 47.9 11/07/2023    MCV 82.4 11/07/2023     11/07/2023     Lab Results   Component Value Date    INR 1.11 (H) 06/19/2021    PROTIME 14.1 06/19/2021     No results found for: \"TSH\", \"B7CSBIK\", \"K3UVWTQ\", \"THYROIDAB\"    I personally viewed and interpreted the patient's EKG/Telemetry/lab data      Assessment and Plan  Diagnoses and all orders for this visit:    1. VT " (ventricular tachycardia) (Primary)    2. ICD (implantable cardioverter-defibrillator) in place    3. Primary hypertension    Other orders  -     propafenone (RYTHMOL) 150 MG tablet; Take 1 tablet by mouth Every 12 (Twelve) Hours.      VT  -Idiopathic ventricular tachycardia triggered by PVCs  -Flecainide started at last clinic visit. Changed to propafenone 150 mg BID due to lips swelling and ringing in his ears. He is doing well on propafenone. Reduced dosing to BID. He is starting to workout again, but has some reservations.   -GXT showed no signs of ischemia  -Genetic testing:  - c.826C>A was identified. 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 Dubon, we will defer cardiac MRI at this time and will revisit in the future. Plan to repeat echo every couple of years.   -patient is having sleep study done tomorrow.     ICD  -Single chamber ICD implanted by Dr. Dumont.   -DEVICE INTERROGATION: [facility] ICD: RV pacing <1%. R wave is 21.5 mV with a threshold of <0.9 V at 0.4 msec and an impedance of 418 ohms, HV 79. Battery voltage is 13.5. Events: none Updates: increased VT zome to 210 bpm, increased VF to 250 bpm.       Hypertension  -Borderline blood pressure, continue metoprolol  -Follow-up with PCP      Body mass index is 42.87 kg/m².    Follow-Up  Return in about 6 months (around 12/6/2024) for with [facility] device check.    Thank you for allowing me to participate in the care of your patient. Please to not hesitate to contact me with additional questions or concerns.     CRUZ Saenz

## 2024-06-06 ENCOUNTER — OFFICE VISIT (OUTPATIENT)
Dept: CARDIOLOGY | Facility: CLINIC | Age: 44
End: 2024-06-06
Payer: COMMERCIAL

## 2024-06-06 VITALS
BODY MASS INDEX: 43.03 KG/M2 | SYSTOLIC BLOOD PRESSURE: 130 MMHG | WEIGHT: 307.4 LBS | HEIGHT: 71 IN | OXYGEN SATURATION: 97 % | HEART RATE: 61 BPM | DIASTOLIC BLOOD PRESSURE: 80 MMHG

## 2024-06-06 DIAGNOSIS — I47.20 VT (VENTRICULAR TACHYCARDIA): Primary | ICD-10-CM

## 2024-06-06 DIAGNOSIS — I10 PRIMARY HYPERTENSION: ICD-10-CM

## 2024-06-06 DIAGNOSIS — Z95.810 ICD (IMPLANTABLE CARDIOVERTER-DEFIBRILLATOR) IN PLACE: ICD-10-CM

## 2024-06-06 RX ORDER — PROPAFENONE HYDROCHLORIDE 150 MG/1
150 TABLET, COATED ORAL EVERY 12 HOURS
Start: 2024-06-06

## 2024-12-05 NOTE — PROGRESS NOTES
Raymond Pascual  [id number]  1980  [phone]      12/12/24      [facility] CARDIOLOGY     Referring Provider: No ref. provider found     Kristian Johnson MD  [address] [place] [address] [address] [place] [address]    Chief Complaint   Patient presents with    VT (ventricular tachycardia)       Problem List  Idiopathic VT/Cardiac arrest/VF  6/2021 [facility] ER: playing basketball, became symptomatic, EMS found in VT at 240bpm, cardioverted at 50J which decompensated to VF and was defibrillated at 200J/given amio bolus 150mg  Echocardiogram 6/19/21: EF 71%, trace MR/TR, borderline concentric hypertrophy  LHC 6/19/21: normal cors  Cardiac MRI 6/22/21: EF 58%, normal RV size and function, no delayed enhancement.  EPS +  [place]: Monomorphic VT from the RV near the region of the HIS, CL 220ms, spontaneously converted. Dr. Dumont  ICD shock 5/8/2023 while playing basketball  Echocardiogram 6/26/23  EF 61-65%, trace TR  Treadmill stress test 6/26/23: negative, normal ECG stress test  ICD shock, 10/27/23 - [facility] ED  HTN  Surgical history  Appendectomy  Carpel tunnel release      History of Present Illness   Raymond Pascual is a 44 y.o. male who presents to my electrophysiology clinic for follow up of idiopathic VT and VF. Since we last saw the patient, he has been doing well.  Previous genetic testing was negative for culprit genetic defect.  Patient continues to exercise and currently trying to lose weight.      Outpatient Medications Marked as Taking for the 12/12/24 encounter (Office Visit) with Boy Dubon MD   Medication Sig Dispense Refill    metoprolol succinate XL (TOPROL-XL) 50 MG 24 hr tablet Take 1 tablet by mouth once daily 90 tablet 3    propafenone (RYTHMOL) 150 MG tablet Take 1 tablet by mouth Every 12 (Twelve) Hours.              Physical Exam  Vitals:    12/12/24 0932   BP: 120/84   Pulse: 56   SpO2: 100%   Weight: (!) 139 kg (306 lb 12.8 oz)   Height: 182.9 cm  "(72\")       Vitals and nursing note reviewed.   Constitutional:       Appearance: Healthy appearance.   HENT:      Head: Normocephalic and atraumatic.      Nose: Nose normal.   Neck:      Vascular: No JVD.   Pulmonary:      Effort: Pulmonary effort is normal.      Breath sounds: Normal breath sounds.   Cardiovascular:      PMI at left midclavicular line. Normal rate. Regular rhythm. Normal S1. Normal S2.       Murmurs: There is no murmur.      No gallop.    Edema:     Peripheral edema absent.   Skin:     General: Skin is warm and dry.   Neurological:      Mental Status: Oriented to person, place and time.   Psychiatric:         Behavior: Behavior normal.           Diagnostic Data    ECG 12 Lead    Date/Time: 12/16/2024 4:54 PM  Performed by: Boy Dubon MD    Authorized by: Boy Dubon MD  Comparison: not compared with previous ECG   Rhythm: sinus rhythm  Rate: normal  Conduction: conduction normal  QRS axis: normal  Other: no other findings    Clinical impression: normal ECG      Device interrogation December 2024  Single-chamber ICD from [employer] VVI 40  RV pacing less than 1% R wave 23.5 pacing threshold 1.0 at 0.4 impedance 429 high-voltage 79  13 years of battery life remaining    Lab Results   Component Value Date    GLUCOSE 102 (H) 11/07/2023    CALCIUM 8.9 11/07/2023     11/07/2023    K 4.5 11/07/2023    CO2 30.0 (H) 11/07/2023     11/07/2023    BUN 17 11/07/2023    CREATININE 1.31 (H) 11/07/2023    EGFRIFAFRI 83 06/20/2021    BCR 13.0 11/07/2023    ANIONGAP 7.0 11/07/2023     Lab Results   Component Value Date    WBC 7.08 11/07/2023    HGB 15.5 11/07/2023    HCT 47.9 11/07/2023    MCV 82.4 11/07/2023     11/07/2023     Lab Results   Component Value Date    INR 1.11 (H) 06/19/2021    PROTIME 14.1 06/19/2021     No results found for: \"TSH\", \"F3YOOIO\", \"M2OSMJJ\", \"THYROIDAB\"    I personally viewed and interpreted the patient's EKG/Telemetry/lab data      Assessment and " Plan  Diagnoses and all orders for this visit:    1. VT (ventricular tachycardia) (Primary)    2. ICD (implantable cardioverter-defibrillator) in place    3. Primary hypertension    Other orders  -     ECG 12 Lead        VT  -Idiopathic ventricular tachycardia triggered by PVCs  -Flecainide started at last clinic visit. Changed to propafenone 150 mg BID due to lips swelling and ringing in his ears. He is doing well on propafenone. Reduced dosing to BID. He is starting to workout again, but has some reservations.   -GXT showed no signs of ischemia  -Genetic testing:  - c.826C>A was identified. 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 Plan to repeat echo every couple of years.       ICD  -Single chamber ICD implanted by Dr. Dumont.       Hypertension  -Borderline blood pressure, continue metoprolol  -Follow-up with PCP      Body mass index is 41.61 kg/m².    Follow-Up  Return in about 1 year (around 12/12/2025) for BSC.    Thank you for allowing me to participate in the care of your patient. Please to not hesitate to contact me with additional questions or concerns.     CRUZ Saenz

## 2024-12-12 ENCOUNTER — OFFICE VISIT (OUTPATIENT)
Dept: CARDIOLOGY | Facility: CLINIC | Age: 44
End: 2024-12-12
Payer: COMMERCIAL

## 2024-12-12 VITALS
BODY MASS INDEX: 41.55 KG/M2 | HEIGHT: 72 IN | SYSTOLIC BLOOD PRESSURE: 120 MMHG | HEART RATE: 56 BPM | WEIGHT: 306.8 LBS | DIASTOLIC BLOOD PRESSURE: 84 MMHG | OXYGEN SATURATION: 100 %

## 2024-12-12 DIAGNOSIS — I10 PRIMARY HYPERTENSION: Chronic | ICD-10-CM

## 2024-12-12 DIAGNOSIS — Z95.810 ICD (IMPLANTABLE CARDIOVERTER-DEFIBRILLATOR) IN PLACE: Chronic | ICD-10-CM

## 2024-12-12 DIAGNOSIS — I47.20 VT (VENTRICULAR TACHYCARDIA): Primary | Chronic | ICD-10-CM

## 2024-12-12 PROCEDURE — 99214 OFFICE O/P EST MOD 30 MIN: CPT | Performed by: INTERNAL MEDICINE

## 2024-12-16 PROCEDURE — 93000 ELECTROCARDIOGRAM COMPLETE: CPT | Performed by: INTERNAL MEDICINE

## 2024-12-17 RX ORDER — METOPROLOL SUCCINATE 50 MG/1
50 TABLET, EXTENDED RELEASE ORAL DAILY
Qty: 90 TABLET | Refills: 0 | Status: SHIPPED | OUTPATIENT
Start: 2024-12-17

## 2025-03-12 RX ORDER — PROPAFENONE HYDROCHLORIDE 150 MG/1
150 TABLET, COATED ORAL EVERY 12 HOURS
Qty: 90 TABLET | Refills: 3
Start: 2025-03-12

## 2025-03-24 RX ORDER — PROPAFENONE HYDROCHLORIDE 150 MG/1
150 TABLET, COATED ORAL EVERY 12 HOURS
Qty: 90 TABLET | Refills: 3
Start: 2025-03-24

## 2025-03-27 RX ORDER — PROPAFENONE HYDROCHLORIDE 150 MG/1
150 TABLET, COATED ORAL EVERY 8 HOURS
Qty: 90 TABLET | Refills: 3 | Status: SHIPPED | OUTPATIENT
Start: 2025-03-27

## 2025-03-27 NOTE — TELEPHONE ENCOUNTER
Does not look like script from 3/12/25 was not sent to pharmacy. No pharmacy was listed. Resent the script.

## 2025-04-16 RX ORDER — METOPROLOL SUCCINATE 50 MG/1
50 TABLET, EXTENDED RELEASE ORAL DAILY
Qty: 90 TABLET | Refills: 0 | OUTPATIENT
Start: 2025-04-16

## 2025-04-17 ENCOUNTER — PATIENT MESSAGE (OUTPATIENT)
Dept: CARDIOLOGY | Facility: CLINIC | Age: 45
End: 2025-04-17
Payer: COMMERCIAL

## 2025-04-17 RX ORDER — METOPROLOL SUCCINATE 50 MG/1
50 TABLET, EXTENDED RELEASE ORAL DAILY
Qty: 90 TABLET | Refills: 0 | OUTPATIENT
Start: 2025-04-17

## 2025-04-17 RX ORDER — METOPROLOL SUCCINATE 50 MG/1
50 TABLET, EXTENDED RELEASE ORAL DAILY
Qty: 90 TABLET | Refills: 3 | Status: SHIPPED | OUTPATIENT
Start: 2025-04-17

## 2025-07-16 LAB
MC_CV_MDC_IDC_RATE_1: 210
MC_CV_MDC_IDC_RATE_1: 250
MC_CV_MDC_IDC_SHOCK_MEASURED_IMPEDANCE: 85
MC_CV_MDC_IDC_THERAPIES: NORMAL
MC_CV_MDC_IDC_THERAPIES: NORMAL
MC_CV_MDC_IDC_ZONE_ID: 1
MC_CV_MDC_IDC_ZONE_ID: 2
MDC_IDC_MSMT_BATTERY_REMAINING_LONGEVITY: 150 MO
MDC_IDC_MSMT_BATTERY_REMAINING_PERCENTAGE: 100 %
MDC_IDC_MSMT_BATTERY_STATUS: NORMAL
MDC_IDC_MSMT_CAP_CHARGE_TIME: 10.2
MDC_IDC_MSMT_LEADCHNL_RV_DTM: NORMAL
MDC_IDC_MSMT_LEADCHNL_RV_IMPEDANCE_VALUE: 425
MDC_IDC_MSMT_LEADCHNL_RV_PACING_THRESHOLD_AMPLITUDE: 0.7
MDC_IDC_MSMT_LEADCHNL_RV_PACING_THRESHOLD_POLARITY: NORMAL
MDC_IDC_MSMT_LEADCHNL_RV_PACING_THRESHOLD_PULSEWIDTH: 0.4
MDC_IDC_MSMT_LEADCHNL_RV_SENSING_INTR_AMPL: 22.1
MDC_IDC_PG_IMPLANT_DTM: NORMAL
MDC_IDC_PG_MFG: NORMAL
MDC_IDC_PG_MODEL: NORMAL
MDC_IDC_PG_SERIAL: NORMAL
MDC_IDC_PG_TYPE: NORMAL
MDC_IDC_SESS_DTM: NORMAL
MDC_IDC_SESS_TYPE: NORMAL
MDC_IDC_SET_BRADY_LOWRATE: 40
MDC_IDC_SET_BRADY_MODE: NORMAL
MDC_IDC_SET_LEADCHNL_RV_PACING_AMPLITUDE: 2
MDC_IDC_SET_LEADCHNL_RV_PACING_POLARITY: NORMAL
MDC_IDC_SET_LEADCHNL_RV_PACING_PULSEWIDTH: 0.4
MDC_IDC_SET_LEADCHNL_RV_SENSING_POLARITY: NORMAL
MDC_IDC_SET_LEADCHNL_RV_SENSING_SENSITIVITY: 0.6
MDC_IDC_SET_ZONE_STATUS: NORMAL
MDC_IDC_SET_ZONE_STATUS: NORMAL
MDC_IDC_SET_ZONE_TYPE: NORMAL
MDC_IDC_SET_ZONE_TYPE: NORMAL
MDC_IDC_STAT_BRADY_RV_PERCENT_PACED: 0
MDC_IDC_STAT_TACHYTHERAPY_ATP_DELIVERED_RECENT: 0
MDC_IDC_STAT_TACHYTHERAPY_SHOCKS_ABORTED_RECENT: 0
MDC_IDC_STAT_TACHYTHERAPY_SHOCKS_DELIVERED_RECENT: 0

## 2025-07-22 LAB
MC_CV_MDC_IDC_RATE_1: 210
MC_CV_MDC_IDC_RATE_1: 210
MC_CV_MDC_IDC_RATE_1: 250
MC_CV_MDC_IDC_RATE_1: 250
MC_CV_MDC_IDC_SHOCK_MEASURED_IMPEDANCE: 81
MC_CV_MDC_IDC_SHOCK_MEASURED_IMPEDANCE: 85
MC_CV_MDC_IDC_THERAPIES: NORMAL
MC_CV_MDC_IDC_ZONE_ID: 1
MC_CV_MDC_IDC_ZONE_ID: 1
MC_CV_MDC_IDC_ZONE_ID: 2
MC_CV_MDC_IDC_ZONE_ID: 2
MDC_IDC_MSMT_BATTERY_REMAINING_LONGEVITY: 150 MO
MDC_IDC_MSMT_BATTERY_REMAINING_LONGEVITY: 150 MO
MDC_IDC_MSMT_BATTERY_REMAINING_PERCENTAGE: 100 %
MDC_IDC_MSMT_BATTERY_REMAINING_PERCENTAGE: 100 %
MDC_IDC_MSMT_BATTERY_STATUS: NORMAL
MDC_IDC_MSMT_BATTERY_STATUS: NORMAL
MDC_IDC_MSMT_CAP_CHARGE_TIME: 10.2
MDC_IDC_MSMT_CAP_CHARGE_TIME: 10.2
MDC_IDC_MSMT_LEADCHNL_RV_DTM: NORMAL
MDC_IDC_MSMT_LEADCHNL_RV_DTM: NORMAL
MDC_IDC_MSMT_LEADCHNL_RV_IMPEDANCE_VALUE: 425
MDC_IDC_MSMT_LEADCHNL_RV_IMPEDANCE_VALUE: 441
MDC_IDC_MSMT_LEADCHNL_RV_PACING_THRESHOLD_AMPLITUDE: 0.6
MDC_IDC_MSMT_LEADCHNL_RV_PACING_THRESHOLD_AMPLITUDE: 0.7
MDC_IDC_MSMT_LEADCHNL_RV_PACING_THRESHOLD_POLARITY: NORMAL
MDC_IDC_MSMT_LEADCHNL_RV_PACING_THRESHOLD_POLARITY: NORMAL
MDC_IDC_MSMT_LEADCHNL_RV_PACING_THRESHOLD_PULSEWIDTH: 0.4
MDC_IDC_MSMT_LEADCHNL_RV_PACING_THRESHOLD_PULSEWIDTH: 0.4
MDC_IDC_MSMT_LEADCHNL_RV_SENSING_INTR_AMPL: 22.1
MDC_IDC_MSMT_LEADCHNL_RV_SENSING_INTR_AMPL: 25
MDC_IDC_PG_IMPLANT_DTM: NORMAL
MDC_IDC_PG_IMPLANT_DTM: NORMAL
MDC_IDC_PG_MFG: NORMAL
MDC_IDC_PG_MFG: NORMAL
MDC_IDC_PG_MODEL: NORMAL
MDC_IDC_PG_MODEL: NORMAL
MDC_IDC_PG_SERIAL: NORMAL
MDC_IDC_PG_SERIAL: NORMAL
MDC_IDC_PG_TYPE: NORMAL
MDC_IDC_PG_TYPE: NORMAL
MDC_IDC_SESS_DTM: NORMAL
MDC_IDC_SESS_DTM: NORMAL
MDC_IDC_SESS_TYPE: NORMAL
MDC_IDC_SESS_TYPE: NORMAL
MDC_IDC_SET_BRADY_LOWRATE: 40
MDC_IDC_SET_BRADY_LOWRATE: 40
MDC_IDC_SET_BRADY_MODE: NORMAL
MDC_IDC_SET_BRADY_MODE: NORMAL
MDC_IDC_SET_LEADCHNL_RV_PACING_AMPLITUDE: 2
MDC_IDC_SET_LEADCHNL_RV_PACING_AMPLITUDE: 2
MDC_IDC_SET_LEADCHNL_RV_PACING_POLARITY: NORMAL
MDC_IDC_SET_LEADCHNL_RV_PACING_POLARITY: NORMAL
MDC_IDC_SET_LEADCHNL_RV_PACING_PULSEWIDTH: 0.4
MDC_IDC_SET_LEADCHNL_RV_PACING_PULSEWIDTH: 0.4
MDC_IDC_SET_LEADCHNL_RV_SENSING_POLARITY: NORMAL
MDC_IDC_SET_LEADCHNL_RV_SENSING_POLARITY: NORMAL
MDC_IDC_SET_LEADCHNL_RV_SENSING_SENSITIVITY: 0.6
MDC_IDC_SET_LEADCHNL_RV_SENSING_SENSITIVITY: 0.6
MDC_IDC_SET_ZONE_STATUS: NORMAL
MDC_IDC_SET_ZONE_TYPE: NORMAL
MDC_IDC_STAT_BRADY_RV_PERCENT_PACED: 0
MDC_IDC_STAT_BRADY_RV_PERCENT_PACED: 0
MDC_IDC_STAT_TACHYTHERAPY_ATP_DELIVERED_RECENT: 0
MDC_IDC_STAT_TACHYTHERAPY_ATP_DELIVERED_RECENT: 0
MDC_IDC_STAT_TACHYTHERAPY_SHOCKS_ABORTED_RECENT: 0
MDC_IDC_STAT_TACHYTHERAPY_SHOCKS_ABORTED_RECENT: 0
MDC_IDC_STAT_TACHYTHERAPY_SHOCKS_DELIVERED_RECENT: 0
MDC_IDC_STAT_TACHYTHERAPY_SHOCKS_DELIVERED_RECENT: 0

## (undated) DEVICE — PREF.GUIDING SHEATH W/MULT.CRV: Brand: PREFACE

## (undated) DEVICE — Device: Brand: WEBSTER

## (undated) DEVICE — CATH DIAG IMPULSE FL3.5 5F 100CM

## (undated) DEVICE — GLIDESHEATH SLENDER STAINLESS STEEL KIT: Brand: GLIDESHEATH SLENDER

## (undated) DEVICE — ELECTRD ECG CARBON/SNP RL FM A/ 5PK

## (undated) DEVICE — INTRO HEMO TRIO 12F

## (undated) DEVICE — PK CATH CARD 40

## (undated) DEVICE — PENCL E/S HNDSWCH ROCKR CB

## (undated) DEVICE — GW EMR FIX EXCHG J STD .035 3MM 260CM

## (undated) DEVICE — KT MANIFLD CARDIAC

## (undated) DEVICE — CATH4F INF PIG 145Â° MOD 110CM: Brand: INFINITI

## (undated) DEVICE — LOU EP: Brand: MEDLINE INDUSTRIES, INC.

## (undated) DEVICE — Device: Brand: REFERENCE PATCH CARTO 3

## (undated) DEVICE — Device

## (undated) DEVICE — LOU PACE DEFIB: Brand: MEDLINE INDUSTRIES, INC.

## (undated) DEVICE — CATH DIAG IMPULSE FR4 5F 100CM

## (undated) DEVICE — SKIN AFFIX SURG ADHESIVE 72/CS 0.55ML: Brand: MEDLINE

## (undated) DEVICE — INTRO SHEATH PRELUDE SNAP .038 8F 13CM W/SDPRT